# Patient Record
Sex: FEMALE | Race: WHITE
[De-identification: names, ages, dates, MRNs, and addresses within clinical notes are randomized per-mention and may not be internally consistent; named-entity substitution may affect disease eponyms.]

---

## 2019-09-09 ENCOUNTER — HOSPITAL ENCOUNTER (INPATIENT)
Dept: HOSPITAL 41 - JD.OB | Age: 34
LOS: 3 days | Discharge: HOME | DRG: 831 | End: 2019-09-12
Attending: OBSTETRICS & GYNECOLOGY | Admitting: OBSTETRICS & GYNECOLOGY
Payer: SELF-PAY

## 2019-09-09 DIAGNOSIS — O34.219: ICD-10-CM

## 2019-09-09 DIAGNOSIS — Z79.899: ICD-10-CM

## 2019-09-09 DIAGNOSIS — Z3A.36: ICD-10-CM

## 2019-09-09 DIAGNOSIS — E03.9: ICD-10-CM

## 2019-09-09 DIAGNOSIS — Z90.49: ICD-10-CM

## 2019-09-09 DIAGNOSIS — Z88.8: ICD-10-CM

## 2019-09-09 DIAGNOSIS — Z88.5: ICD-10-CM

## 2019-09-09 DIAGNOSIS — Z88.0: ICD-10-CM

## 2019-09-09 DIAGNOSIS — K83.1: ICD-10-CM

## 2019-09-09 NOTE — PCM.LDHP
L&D History of Present Illness





- General


Date of Service: 19


Admit Problem/Dx: 


 Admission Diagnosis/Problem











Admission Diagnosis/Problem    














Source of Information: Patient


History Limitations: Reports: No Limitations





- History of Present Illness


Introduction:: 





Patient is a 35 y/o  who presents at 36 2/7 wks for further monitoring.

  Patient with known cholestasis of pregnancy and plans for delivery at the end 

of this week.  Today in clinic had a mild range BP and then normal repeat.  

Labs done and notable for ALT of 95 and slightly elevated AST of 36.  

Creatinine and Plts normal.  Patient feeling well.  No headache, vision 

changes.  Had some RUQ pain yesterday 





- Related Data


Allergies/Adverse Reactions: 


 Allergies











Allergy/AdvReac Type Severity Reaction Status Date / Time


 


lead Allergy  PT SPECIFIC Verified 19 17:18


 


mercury (elemental) Allergy  PT SPECIFIC Verified 19 17:18


 


acetaminophen [From Percocet] AdvReac  Seizure Verified 19 17:18


 


codeine AdvReac  Seizure Verified 19 17:18


 


oxycodone AdvReac  Seizure Verified 19 17:18


 


oxycodone HCl [From Percocet] AdvReac  Seizure Verified 19 17:18


 


penicillin G AdvReac  Seizure Verified 19 17:18











Home Medications: 


 Home Meds





Pnv with Ca,No.71/Iron/Fa [Prenatal Vitamin Tablet] 1 tab PO DAILY 08/28/15 [

History]


Thyroid T3 Compound 70 mcg PO DAILY 08/28/15 [History]


Ursodiol [Actigal] 300 mg PO TID 08/28/15 [History]


Acetaminophen [Tylenol] 650 mg PO Q6H #50 tablet 08/31/15 [Rx]


Acetaminophen/HYDROcodone [Norco 325-5 MG] 1 tab PO Q6H PRN #12 tablet 08/31/15 

[Rx]


Docusate Sodium [Colace] 100 mg PO Q12H PRN #50 cap 08/31/15 [Rx]


Ibuprofen [Motrin] 200 - 800 mg PO Q6H PRN #50 tablet 08/31/15 [Rx]


Lanolin [Lansinoh HPA] 1 applic TOP ASDIRECTED PRN #1 crm 08/31/15 [Rx]


Simethicone 80 mg PO PCBED #50 tab.chew 08/31/15 [Rx]











Past Medical History


: 6


Para: 5 (6 living children)


LMP (Approximate): Pregnant


Other OB/BYN History:  -  breech.   - .   - .  

 - .  2015 - , breech twins


Psychiatric History: Reports: Anxiety





- Past Surgical History


GI Surgical History: Reports: Cholecystectomy


Female  Surgical History: Reports:  Section (, ), Cystectomy, 

Other (See Below) (diagnostic lap)





Social & Family History





- Tobacco Use


Smoking Status *Q: Never Smoker





- Alcohol Use


Alcohol Use History: No





- Recreational Drug Use


Recreational Drug Use: No





H&P Review of Systems





- Review of Systems:


Review Of Systems: See Below


General: Reports: No Symptoms


Pulmonary: Reports: No Symptoms


Cardiovascular: Reports: No Symptoms


Gastrointestinal: Reports: Abdominal Pain (contractions)


Genitourinary: Reports: No Symptoms


Musculoskeletal: Reports: No Symptoms


Psychiatric: Reports: No Symptoms


Neurological: Reports: No Symptoms





L&D Exam





- Exam


Exam: See Below





- Vital Signs


Weight: 96.252 kg





- OB Specific


Contraction Intensity: Irritability


Fetal Movement: Active


Fetal Heart Tones: Present


Fetal Heart Tones per Min: 145


Fetal Heart Rate (FHR) Variability: Moderate (6-25 bmp)


Birth Presentation: Breech





- Browne Score


Browne Score Cervix Position: Posterior


Browne Score Consistency: Soft


Browne Score Effacement: 51-70%


Browne Score Dilation: 1-2 cm


Browne Score Infant's Station: -3


Browne Score Total: 5





- Exam


General: Alert, Oriented, Cooperative


Lungs: Clear to Auscultation, Normal Respiratory Effort


Cardiovascular: Regular Rate, Regular Rhythm


GI/Abdominal Exam: Soft, Non-Tender


Genitourinary: Normal external exam


Extremities: Normal Inspection


Skin: Warm, Dry, Intact


DTR: 2+: Patella (L), Patella (R)





- Problem List


(1) 36 weeks gestation of pregnancy


SNOMED Code(s): 13774803


   ICD Code: Z3A.36 - 36 WEEKS GESTATION OF PREGNANCY   Status: Acute   Current 

Visit: Yes   





(2) Cholestasis


SNOMED Code(s): 58957673


   ICD Code: K83.1 - OBSTRUCTION OF BILE DUCT   Status: Acute   Current Visit: 

Yes   





(3) Preeclampsia


SNOMED Code(s): 108060143


   ICD Code: O14.90 - UNSPECIFIED PRE-ECLAMPSIA, UNSPECIFIED TRIMESTER   Status

: Acute   Current Visit: Yes   


Qualifiers: 


   Trimester: third trimester   Qualified Code(s): O14.93 - Unspecified pre-

eclampsia, third trimester   





(4) History of 


SNOMED Code(s): 522454181


   ICD Code: Z98.891 - HISTORY OF UTERINE SCAR FROM PREVIOUS SURGERY   Status: 

Acute   Current Visit: Yes   





(5) History of 


SNOMED Code(s): 136911927, 304115563


   ICD Code: Z98.891 - HISTORY OF UTERINE SCAR FROM PREVIOUS SURGERY   Status: 

Acute   Current Visit: Yes   





(6) Breech presentation


SNOMED Code(s): 9685565


   ICD Code: O32.1XX0 - MATERNAL CARE FOR BREECH PRESENTATION, UNSP   Status: 

Acute   Current Visit: Yes   


Qualifiers: 


   Fetus number: single or unspecified fetus   Qualified Code(s): O32.1XX0 - 

Maternal care for breech presentation, not applicable or unspecified   


Problem List Initiated/Reviewed/Updated: Yes


Orders Last 24hrs: 


 Active Orders 24 hr











 Category Date Time Status


 


 Communication Order [RC] ROUTINE Care  19 17:37 Ordered


 


 Fetal Heart Tones [RC] PER UNIT ROUTINE Care  19 17:37 Ordered


 


 Fetal Non Stress Test [RC] PER UNIT ROUTINE Care  19 17:06 Active


 


 Peripheral IV Care [RC] .AS DIRECTED Care  19 17:07 Active


 


 Peripheral IV Care [RC] .AS DIRECTED Care  19 17:37 Ordered


 


 Procedure Site Prep Instruct [RC] ASDIRECTED Care  19 17:37 Ordered


 


 Up ad Malathi [RC] ASDIRECTED Care  19 17:06 Active


 


 Verify Patient Consent Obtain [RC] PER UNIT ROUTINE Care  19 17:37 

Ordered


 


 Vital Signs [RC] PER UNIT ROUTINE Care  19 17:06 Active


 


 Vital Signs [RC] PFP Care  19 17:37 Ordered


 


 Nothing Per Oral Diet [DIET] Diet  19 Dinner Active


 


 TYPE AND SCREEN [BBK] Routine Lab  19 17:32 Received


 


 Citric Acid/Sodium Citrate [Bicitra Solution] Med  19 17:37 Once





 30 ml PO ONETIME ONE   


 


 Lactated Ringers @ 125 MLS/HR(1000ml) Med  19 17:45 Ordered





 Lactated Ringers [Ringers, Lactated] 1,000 ml   





 IV ASDIRECTED   


 


 Metoclopramide [Reglan] Med  19 17:37 Once





 10 mg IVPUSH ONETIME ONE   


 


 Nalbuphine [Nubain] Med  19 17:37 Ordered





 10 mg IVPUSH Q2H PRN   


 


 Oxytocin/Lactated Ringers [Pitocin in LR 10 Units/1,000 Med  19 17:45 

Ordered





 ML]   





 10 unit in 1,000 ml IV ASDIRECTED   


 


 Sodium Chloride 0.9% [Saline Flush] Med  19 17:06 Active





 10 ml FLUSH ASDIRECTED PRN   


 


 Sodium Chloride 0.9% [Saline Flush] Med  19 17:37 Ordered





 10 ml FLUSH ASDIRECTED PRN   


 


 ceFAZolin [Ancef] 2 gm Med  19 17:37 Ordered





 Premix Bag 1 bag   





 IV ONETIME   


 


 Peripheral IV Insertion Adult [OM.PC] Routine Oth  19 17:37 Ordered


 


 Peripheral IV Insertion Adult [OM.PC] Urgent Oth  19 17:06 Ordered


 


 Schedule Procedure [COMM] Per Unit Routine Oth  19 17:37 Ordered


 


 Resuscitation Status Routine Resus Stat  19 17:06 Ordered








 Medication Orders





Citric Acid/Sodium Citrate (Bicitra Solution)  30 ml PO ONETIME ONE


   Stop: 19 17:38


Cefazolin Sodium/Dextrose 2 gm (/ Premix)  50 mls @ 100 mls/hr IV ONETIME ONE


   Stop: 19 18:06


Lactated Ringer's (Ringers, Lactated)  1,000 mls @ 125 mls/hr IV ASDIRECTED MARIA LUZ


Oxytocin/Lactated Ringer's (Pitocin In Lr 10 Units/1,000 Ml)  10 unit in 1,000 

mls @ 100 mls/hr IV ASDIRECTED MARIA LUZ; Protocol


Metoclopramide HCl (Reglan)  10 mg IVPUSH ONETIME ONE


   Stop: 19 17:38


Nalbuphine HCl (Nubain)  10 mg IVPUSH Q2H PRN


   PRN Reason: Pain


Sodium Chloride (Saline Flush)  10 ml FLUSH ASDIRECTED PRN


   PRN Reason: Keep Vein Open


Sodium Chloride (Saline Flush)  10 ml FLUSH ASDIRECTED PRN


   PRN Reason: Keep Vein Open








Assessment/Plan Comment:: 





Patient is a 35 y/o  at 36 2/7 wks who presents for further monitoring 

of elevated ALT/slightly elevated AST.  LFT elevation thought to be likely from 

cholestasis, however, did have one mild range BP in clinic.  Now with multiple 

mild range BP's on L&D.  Will move forward with delivery plans this admission.  

Patient with one  for breech, then 3 successful 's, then  

for breech twins.  Interested in TOLAC, but breech again this pregnancy.  Would 

like to do an attempt at ECV.  If successful will move forward with IOL.  If 

unsuccessful will proceed with RLTCS.  She agrees.  Consents signed.  OR/

anesthesia teams made aware.  








Mai Villarreal MD

## 2019-09-09 NOTE — PCM.PREANE
Preanesthetic Assessment





- Procedure


Proposed Procedure: 





attempted version 





- Anesthesia/Transfusion/Family Hx


Anesthesia History: Prior Anesthesia Without Reaction


Family History of Anesthesia Reaction: No


Transfusion History: No Prior Transfusion(s)


Type of Transfusion Reactions: Reports: Unknown


Intubation History: Unknown





- Review of Systems


General: No Symptoms


Pulmonary: No Symptoms


Cardiovascular: No Symptoms


Gastrointestinal: No Symptoms


Neurological: No Symptoms


Other: Reports: Thyroid Problems (hypothryoidism )





- Physical Assessment


NPO Status Date: 19


NPO Status Time: 16:00


Vital Signs: 





 Last Vital Signs











Temp  36.9 C   19 17:06


 


Pulse  115 H  19 17:06


 


Resp  18   19 17:06


 


BP  141/61 H  19 17:06


 


Pulse Ox      











Height: 1.65 m


Weight: 96.252 kg


ASA Class: 2E


Mental Status: Alert & Oriented x3


Airway Class: Mallampati = 1


Dentition: Reports: Crown(s) (multiple/ permanent )


Thyro-Mental Finger Breadths: 3


Mouth Opening Finger Breadths: 5


ROM/Head Extension: Full


Lungs: Clear to Auscultation, Normal Respiratory Effort


Cardiovascular: Regular Rate, Regular Rhythm





- Allergies


Allergies/Adverse Reactions: 


 Allergies











Allergy/AdvReac Type Severity Reaction Status Date / Time


 


lead Allergy  PT SPECIFIC Verified 19 17:18


 


mercury (elemental) Allergy  PT SPECIFIC Verified 19 17:18


 


acetaminophen [From Percocet] AdvReac  Seizure Verified 19 17:18


 


codeine AdvReac  Seizure Verified 19 17:18


 


oxycodone AdvReac  Seizure Verified 19 17:18


 


oxycodone HCl [From Percocet] AdvReac  Seizure Verified 19 17:18


 


penicillin G AdvReac  Seizure Verified 19 17:18














- Blood


Blood Available: No





PreAnesthesia Questionnaire


OB/GYN History: Reports: Polycystic Ovaries, Pregnancy


Other OB/BYN History: 2003 -  breech.  2005 - .   - .  

2013 - .  2015 - , breech twins


Neurological History: Reports: Other (See Below)


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


Psychiatric History: Reports: Anxiety


Endocrine/Metabolic History: Reports: Hypothyroidism





- Past Surgical History


GI Surgical History: Reports: Cholecystectomy


Female  Surgical History: Reports:  Section (, ), Cystectomy, 

Other (See Below) (diagnostic lap)





- SUBSTANCE USE


Smoking Status *Q: Never Smoker


Tobacco Use Within Last Twelve Months: No


Second Hand Smoke Exposure: No


Recreational Drug Use History: No





- HOME MEDS


Home Medications: 


 Home Meds





Pnv with Ca,No.71/Iron/Fa [Prenatal Vitamin Tablet] 1 tab PO DAILY 08/28/15 [

History]


Thyroid T3 Compound 70 mcg PO DAILY 08/28/15 [History]


Ursodiol [Actigal] 300 mg PO TID 08/28/15 [History]


Acetaminophen [Tylenol] 650 mg PO Q6H #50 tablet 08/31/15 [Rx]


Acetaminophen/HYDROcodone [Norco 325-5 MG] 1 tab PO Q6H PRN #12 tablet 08/31/15 

[Rx]


Docusate Sodium [Colace] 100 mg PO Q12H PRN #50 cap 08/31/15 [Rx]


Ibuprofen [Motrin] 200 - 800 mg PO Q6H PRN #50 tablet 08/31/15 [Rx]


Lanolin [Lansinoh HPA] 1 applic TOP ASDIRECTED PRN #1 crm 08/31/15 [Rx]


Simethicone 80 mg PO PCBED #50 tab.chew 08/31/15 [Rx]











- CURRENT (IN HOUSE) MEDS


Current Meds: 





 Current Medications





Lactated Ringer's (Ringers, Lactated)  1,000 mls @ 125 mls/hr IV ASDIRECTED MARIA LUZ


Oxytocin/Lactated Ringer's (Pitocin In Lr 10 Units/1,000 Ml)  10 unit in 1,000 

mls @ 100 mls/hr IV ASDIRECTED MARIA LUZ; Protocol


Nalbuphine HCl (Nubain)  10 mg IVPUSH Q2H PRN


   PRN Reason: Pain


Sodium Chloride (Saline Flush)  10 ml FLUSH ASDIRECTED PRN


   PRN Reason: Keep Vein Open


Sodium Chloride (Saline Flush)  10 ml FLUSH ASDIRECTED PRN


   PRN Reason: Keep Vein Open





Discontinued Medications





Cefazolin Sodium (Ancef) Confirm Administered Dose 2 gm .ROUTE .STK-MED ONE


   Stop: 19 17:57


Citric Acid/Sodium Citrate (Bicitra Solution)  30 ml PO ONETIME ONE


   Stop: 19 17:38


Cefazolin Sodium/Dextrose 2 gm (/ Premix)  50 mls @ 100 mls/hr IV ONETIME ONE


   Stop: 19 18:06


Metoclopramide HCl (Reglan)  10 mg IVPUSH ONETIME ONE


   Stop: 19 17:38


Morphine Sulfate (Duramorph Pf) Confirm Administered Dose 10 mg .ROUTE .STK-MED 

ONE


   Stop: 19 17:56


Ondansetron HCl (Zofran) Confirm Administered Dose 4 mg .ROUTE .STK-MED ONE


   Stop: 19 17:57


Oxytocin (Pitocin) Confirm Administered Dose 10 unit .ROUTE .STK-MED ONE


   Stop: 19 17:55


Terbutaline Sulfate (Brethine)  0.25 mg SUBCUT ONETIME ONE


   Stop: 19 18:03

## 2019-09-09 NOTE — PCM.PRNOTE
- Free Text/Narrative


Note: 





PROCEDURE NOTE





Procedure Date: 2019





Pre-operative Diagnosis:


1. Pregnancy at 36 2/7


2. Breech Presentation


3. Hx of  x2, hx of  x3


4. Cholestasis of pregnancy 


5. Preeclampsia 





Post-operative Diagnosis: 


1. As above s/p successful external cephalic version 





Anesthesia: None


 


Description of Operation/Procedure: 


The risks, benefits, indications, potential complications, and alternatives 

were explained to the patient and informed consent obtained.





Patient was placed in the supine position.  Ultrasound was used to confirm 

complete breech presentation and appropriate MINGO.  Terbutaline 0.25 mg 

subcutaneous was given.  Hands were placed on the patient's abdomen.  The 

breech was elevated out of the pelvis while clockwise traction was applied to 

the head.  No movement of baby felt with this motion.  Next, counterclockwise 

traction done and baby was felt to turn.  Ultrasound confirmed cephalic 

presentation as well as fetal cardiac activity.





Complications: The patient tolerated the procedure well and no complications 

were noted. 





Plan:   Will move forward with IOL.  Pitocin to be started.  AROM when able.  

Plan frequent assessments of fetal position in labor

## 2019-09-09 NOTE — PCM.PNLD
Labor Progress Note





- VS & Meds


Vital Signs: 


 Last Vital Signs











Temp  36.9 C   09/09/19 17:06


 


Pulse  115 H  09/09/19 17:06


 


Resp  18   09/09/19 17:06


 


BP  141/61 H  09/09/19 17:06


 


Pulse Ox      











Active Medications: 


 Current Medications





Lactated Ringer's (Ringers, Lactated)  1,000 mls @ 125 mls/hr IV ASDIRECTED MARIA LUZ


   Last Admin: 09/09/19 18:21 Dose:  125 mls/hr


Oxytocin/Lactated Ringer's (Pitocin In Lr 10 Units/1,000 Ml)  10 unit in 1,000 

mls @ 100 mls/hr IV ASDIRECTED MARIA LUZ; Protocol


Oxytocin/Lactated Ringer's (Pitocin In Lr 10 Units/1,000 Ml)  10 unit in 1,000 

mls @ 12 mls/hr IV TITRATE MARIA LUZ; Protocol


   Last Titration: 09/09/19 20:58 Dose:  12 munits/min, 72 mls/hr


Nalbuphine HCl (Nubain)  10 mg IVPUSH Q2H PRN


   PRN Reason: Pain


Sodium Chloride (Saline Flush)  10 ml FLUSH ASDIRECTED PRN


   PRN Reason: Keep Vein Open


Sodium Chloride (Saline Flush)  10 ml FLUSH ASDIRECTED PRN


   PRN Reason: Keep Vein Open





Discontinued Medications





Cefazolin Sodium (Ancef) Confirm Administered Dose 2 gm .ROUTE .STK-MED ONE


   Stop: 09/09/19 17:57


Citric Acid/Sodium Citrate (Bicitra Solution)  30 ml PO ONETIME ONE


   Stop: 09/09/19 17:38


Cefazolin Sodium/Dextrose 2 gm (/ Premix)  50 mls @ 100 mls/hr IV ONETIME ONE


   Stop: 09/09/19 18:06


Metoclopramide HCl (Reglan)  10 mg IVPUSH ONETIME ONE


   Stop: 09/09/19 17:38


Morphine Sulfate (Duramorph Pf) Confirm Administered Dose 10 mg .ROUTE .STK-MED 

ONE


   Stop: 09/09/19 17:56


Nalbuphine HCl (Nubain)  10 mg IVPUSH Q2H PRN


   PRN Reason: Pain


Ondansetron HCl (Zofran) Confirm Administered Dose 4 mg .ROUTE .STK-MED ONE


   Stop: 09/09/19 17:57


Oxytocin (Pitocin) Confirm Administered Dose 10 unit .ROUTE .STK-MED ONE


   Stop: 09/09/19 17:55


Terbutaline Sulfate (Brethine)  0.25 mg SUBCUT ONETIME ONE


   Stop: 09/09/19 18:03


   Last Admin: 09/09/19 18:21 Dose:  0.25 mg











- Uterine Contractions


Uterine Monitoring Mode: External [place]


Contraction Intensity: Mild


Uterine Resting Tone: Soft





- Fetal Monitoring


Fetal Monitor Mode: External Ultrasound


Fetal Heart Rate (FHR) Baseline: 150


Fetal Heart Rate (FHR) Variability: Moderate (6-25 bmp)


Fetal Accelerations: Present, 15x15


Fetal Decelerations: None


Fetal Strip Review: Category I





- Vaginal Exam


Dilation (cm): 3-4


Effacement (Percent): 50


Station: -2


Cervical Position: Posterior





- Labor Progress (Free Text)


Labor Progress: 





Doing well.  Cantrell bulb just came out.  Pitocin at at 12.  Patient with good 

change.  Rates contractions as a 3/10 in intensity.  AROM performed with 

release of large amount of meconium stained fluid.  Continue present management

## 2019-09-10 PROCEDURE — 4A1HXCZ MONITORING OF PRODUCTS OF CONCEPTION, CARDIAC RATE, EXTERNAL APPROACH: ICD-10-PCS | Performed by: OBSTETRICS & GYNECOLOGY

## 2019-09-10 PROCEDURE — 10S0XZZ REPOSITION PRODUCTS OF CONCEPTION, EXTERNAL APPROACH: ICD-10-PCS | Performed by: OBSTETRICS & GYNECOLOGY

## 2019-09-10 PROCEDURE — 10H07YZ INSERTION OF OTHER DEVICE INTO PRODUCTS OF CONCEPTION, VIA NATURAL OR ARTIFICIAL OPENING: ICD-10-PCS | Performed by: OBSTETRICS & GYNECOLOGY

## 2019-09-10 PROCEDURE — 10907ZC DRAINAGE OF AMNIOTIC FLUID, THERAPEUTIC FROM PRODUCTS OF CONCEPTION, VIA NATURAL OR ARTIFICIAL OPENING: ICD-10-PCS | Performed by: OBSTETRICS & GYNECOLOGY

## 2019-09-10 PROCEDURE — 3E033VJ INTRODUCTION OF OTHER HORMONE INTO PERIPHERAL VEIN, PERCUTANEOUS APPROACH: ICD-10-PCS | Performed by: OBSTETRICS & GYNECOLOGY

## 2019-09-10 NOTE — PCM.DEL
L & D Note





- General Info


Date of Service: 09/10/19





- Delivery Note


Labor: Induced by ARM


Cervical Ripening Method: Balloon Device, Oxytocin


Delivery Outcome: Livebirth


Infant Delivery Method: Spontaneous Vaginal Delivery-Single


Infant Delivery Mode: Spontaneous


Birth Presentation: Vertex


Nuchal Cord: Present, Reduced


Anesthesia Type: Epidural


Amniotic Fluid Description: Meconium Stained


Episiotomy Type: None


Laceration: None


Placenta: Intact, Spontaneous


Cord: 3 Vessels


Estimated Blood Loss: 750


: Bulb Syringe, Stimulated, Warmed, [place]obeetie Used, Warmer Used


Delivery Comments (Free Text/Narrative):: 





Patient found to be complete and began pushing.  With maternal pushing effort 

fetal head delivered from TANK presentation.  Nuchal cord present and reduced.  

With gentle downward traction fetal shoulders and body delivered.  Infant 

placed on maternal abdomen.  Cord clamped and cut.  Baby handed to awaiting 

Pediatrician.  Cord blood collected. Placenta allowed time to separate and 

expelled intact.  Patient with poor uterine tone and increased bleeding.  Given 

600 mcg of buccal cytotec and then 250 mcg of IM Hemabate.  Still with poor 

response and so tranexamic acid begun.  Multiple sweeps of PEDRO done with 

removal of large clot.  As still with difficulty patient given also dose of 0.2 

IM methergine and bladder emptied of scant amout of urine.  Finally bleeding 

responded.  Inspection of perineum showed no lacerations.  





--------------





About 1.5 hours after delivery patient in bed and not easily awoken.  Rapid 

response called, but then patient improved.  Seemed to have syncopal episode.  

Uterine bleeding WNL, uterine tone good.  Labs done and WNL.  Bladder emptied 

of 800 cc urine via straight catheterization.  Continue to monitor closely 





- General Info


Date of Service: 09/10/19





- Patient Data


Vitals - Most Recent: 


 Last Vital Signs











Temp  36.9 C   19 17:06


 


Pulse  115 H  19 17:06


 


Resp  18   19 17:06


 


BP  141/61 H  19 17:06


 


Pulse Ox      











Weight - Most Recent: 96.252 kg


Lab Results Last 24 Hours: 


 Laboratory Results - last 24 hr











  19 Range/Units





  17:32 


 


Blood Type  O POSITIVE  


 


Gel Antibody Screen  Negative  











Med Orders - Current: 


 Current Medications





Ephedrine Sulfate (Ephedrine Sulfate)  5 mg IVPUSH ASDIRECTED PRN


   PRN Reason: Hypotension


Fentanyl (Sublimaze)  100 mcg EPIDUR Q3H PRN


   PRN Reason: Pain


   Last Admin: 09/10/19 05:42 Dose:  100 mcg


Fentanyl/Bupivacaine HCl (Fentanyl/Bupivacaine/Ns 2 Mcg-0.125% 100 Ml)  100 ml 

EPIDUR ASDIRECTED MARIA LUZ


   Last Admin: 09/10/19 05:42 Dose:  100 ml


Lactated Ringer's (Ringers, Lactated)  1,000 mls @ 125 mls/hr IV ASDIRECTED MARIA LUZ


   Last Admin: 09/10/19 06:05 Dose:  125 mls/hr


Oxytocin/Lactated Ringer's (Pitocin In Lr 10 Units/1,000 Ml)  10 unit in 1,000 

mls @ 12 mls/hr IV TITRATE MARIA LUZ; Protocol


   Last Titration: 09/10/19 01:40 Dose:  18 munits/min, 108 mls/hr


Oxytocin 20 unit/ Lactated (Ringer's)  1,002 mls @ 60.12 mls/hr IV TITRATE MARIA LUZ; 

Protocol


   Last Titration: 09/10/19 09:15 Dose:  30 munits/min, 90.18 mls/hr


Nalbuphine HCl (Nubain)  10 mg IVPUSH Q2H PRN


   PRN Reason: Pain


Sodium Chloride (Saline Flush)  10 ml FLUSH ASDIRECTED PRN


   PRN Reason: Keep Vein Open


Sodium Chloride (Saline Flush)  10 ml FLUSH ASDIRECTED PRN


   PRN Reason: Keep Vein Open





Discontinued Medications





Carboprost Tromethamine (Hemabate Ds) Confirm Administered Dose 250 mcg .ROUTE 

.STK-MED ONE


   Stop: 09/10/19 10:58


Cefazolin Sodium (Ancef) Confirm Administered Dose 2 gm .ROUTE .STK-MED ONE


   Stop: 19 17:57


Citric Acid/Sodium Citrate (Bicitra Solution)  30 ml PO ONETIME ONE


   Stop: 19 17:38


Cefazolin Sodium/Dextrose 2 gm (/ Premix)  50 mls @ 100 mls/hr IV ONETIME ONE


   Stop: 19 18:06


Oxytocin/Lactated Ringer's (Pitocin In Lr 10 Units/1,000 Ml)  10 unit in 1,000 

mls @ 100 mls/hr IV ASDIRECTED MARIA LUZ; Protocol


Metoclopramide HCl (Reglan)  10 mg IVPUSH ONETIME ONE


   Stop: 19 17:38


Misoprostol (Cytotec) Confirm Administered Dose 600 mcg .ROUTE .STK-MED ONE


   Stop: 09/10/19 10:52


Morphine Sulfate (Duramorph Pf) Confirm Administered Dose 10 mg .ROUTE .STK-MED 

ONE


   Stop: 19 17:56


Nalbuphine HCl (Nubain)  10 mg IVPUSH Q2H PRN


   PRN Reason: Pain


Ondansetron HCl (Zofran) Confirm Administered Dose 4 mg .ROUTE .STK-MED ONE


   Stop: 19 17:57


Oxytocin (Pitocin) Confirm Administered Dose 10 unit .ROUTE .STK-MED ONE


   Stop: 19 17:55


Terbutaline Sulfate (Brethine)  0.25 mg SUBCUT ONETIME ONE


   Stop: 19 18:03


   Last Admin: 19 18:21 Dose:  0.25 mg


Tranexamic Acid (Cyklokapron) Confirm Administered Dose 1,000 mg .ROUTE .STK-

MED ONE


   Stop: 09/10/19 10:56











- Problem List & Annotations


(1) 36 weeks gestation of pregnancy


SNOMED Code(s): 63910463


   Code(s): Z3A.36 - 36 WEEKS GESTATION OF PREGNANCY   Status: Acute   Current 

Visit: Yes   





(2) Cholestasis


SNOMED Code(s): 29438102


   Code(s): K83.1 - OBSTRUCTION OF BILE DUCT   Status: Acute   Current Visit: 

Yes   





(3) Preeclampsia


SNOMED Code(s): 598551832


   Code(s): O14.90 - UNSPECIFIED PRE-ECLAMPSIA, UNSPECIFIED TRIMESTER   Status: 

Acute   Current Visit: Yes   


Qualifiers: 


   Trimester: third trimester   Qualified Code(s): O14.93 - Unspecified pre-

eclampsia, third trimester   





(4) History of 


SNOMED Code(s): 191816801


   Code(s): Z98.891 - HISTORY OF UTERINE SCAR FROM PREVIOUS SURGERY   Status: 

Acute   Current Visit: Yes   





(5) History of 


SNOMED Code(s): 168860008, 486495224


   Code(s): Z98.891 - HISTORY OF UTERINE SCAR FROM PREVIOUS SURGERY   Status: 

Acute   Current Visit: Yes   





(6) Breech presentation


SNOMED Code(s): 1122672


   Code(s): O32.1XX0 - MATERNAL CARE FOR BREECH PRESENTATION, UNSP   Status: 

Acute   Current Visit: Yes   


Qualifiers: 


   Fetus number: single or unspecified fetus   Qualified Code(s): O32.1XX0 - 

Maternal care for breech presentation, not applicable or unspecified   





(7) Successful external cephalic version


SNOMED Code(s): 27711297


   Code(s): PGL8167 -    Status: Acute   Current Visit: Yes   





(8) , delivered, current hospitalization


SNOMED Code(s): 872032665


   Code(s): O34.219 - MATERNAL CARE FOR UNSP TYPE SCAR FROM PREVIOUS  

DEL   Status: Acute   Current Visit: Yes   





(9) Postpartum hemorrhage


SNOMED Code(s): 07212398


   Code(s): O72.1 - OTHER IMMEDIATE POSTPARTUM HEMORRHAGE   Status: Acute   

Current Visit: Yes   





- Problem List Review


Problem List Initiated/Reviewed/Updated: Yes





- My Orders


Last 24 Hours: 


My Active Orders





19 17:06


Up ad Malathi [RC] ASDIRECTED 


Vital Signs [RC] 21,03,09,15 


Sodium Chloride 0.9% [Saline Flush]   10 ml FLUSH ASDIRECTED PRN 


Peripheral IV Insertion Adult [OM.PC] Urgent 


Resuscitation Status Routine 





19 17:37


Communication Order [RC] ROUTINE 


Fetal Heart Tones [RC] PER UNIT ROUTINE 


Peripheral IV Care [RC] Q2HR 


Vital Signs [RC] PFP 


Sodium Chloride 0.9% [Saline Flush]   10 ml FLUSH ASDIRECTED PRN 


Peripheral IV Insertion Adult [OM.PC] Routine 





19 17:45


Lactated Ringers [Ringers, Lactated] 1,000 ml IV ASDIRECTED 





19 18:42


Patient Status [ADT] Routine 


Communication Order [RC] ASDIRECTED 


Notify Provider [RC] ASDIRECTED 


Notify Provider [RC] PRN 


Vaginal Exam [RC] ASDIRECTED 


Nalbuphine [Nubain]   10 mg IVPUSH Q2H PRN 


Electronic Fetal Heart Tones Internal [WOMSER] Per Unit Routine 





19 18:45


Oxytocin/Lactated Ringers [Pitocin in LR 10 Units/1,000 ML] 10 unit in 1,000 ml 

IV TITRATE 





19 Dinner


Nothing Per Oral Diet [DIET] 





09/10/19 06:15


Oxytocin [Pitocin] 20 unit   Lactated Ringers [Ringers, Lactated] 1,000 ml IV 

TITRATE 





09/10/19 11:23


Carboprost Tromethamine [Hemabate DS]   250 mcg IM ONETIME ONE 


Methylergonovine [Methergine]   0.2 mg IM Q4H STA 


Tranexamic Acid [Cyklokapron]   1,000 mg IVPUSH ONETIME ONE 


ceFAZolin [Ancef] 2 gm   Premix Bag 1 bag IV ONETIME 


miSOPROStol [Cytotec]   600 mcg PO NOW STA 














- Assessment


Assessment:: 





33 y/o G6 now  PPD#0 from  at 36 3/7 wks after ECV and IOL for 

cholestasis/preeclampsia 





- Plan


Plan:: 





/PPH


* ROutine cares


* Monitor bleeding closely 


* Breast feeding


* Discharge in 2 days

## 2019-09-10 NOTE — PCM.PNLD
Labor Progress Note





- VS & Meds


Vital Signs: 


 Last Vital Signs











Temp  36.9 C   09/09/19 17:06


 


Pulse  115 H  09/09/19 17:06


 


Resp  18   09/09/19 17:06


 


BP  141/61 H  09/09/19 17:06


 


Pulse Ox      











Active Medications: 


 Current Medications





Ephedrine Sulfate (Ephedrine Sulfate)  5 mg IVPUSH ASDIRECTED PRN


   PRN Reason: Hypotension


Fentanyl (Sublimaze)  100 mcg EPIDUR Q3H PRN


   PRN Reason: Pain


   Last Admin: 09/10/19 05:42 Dose:  100 mcg


Fentanyl/Bupivacaine HCl (Fentanyl/Bupivacaine/Ns 2 Mcg-0.125% 100 Ml)  100 ml 

EPIDUR ASDIRECTED MARIA LUZ


   Last Admin: 09/10/19 05:42 Dose:  100 ml


Lactated Ringer's (Ringers, Lactated)  1,000 mls @ 125 mls/hr IV ASDIRECTED MARIA LUZ


   Last Admin: 09/10/19 06:05 Dose:  125 mls/hr


Oxytocin/Lactated Ringer's (Pitocin In Lr 10 Units/1,000 Ml)  10 unit in 1,000 

mls @ 100 mls/hr IV ASDIRECTED MARIA LUZ; Protocol


Oxytocin/Lactated Ringer's (Pitocin In Lr 10 Units/1,000 Ml)  10 unit in 1,000 

mls @ 12 mls/hr IV TITRATE MARIA LUZ; Protocol


   Last Titration: 09/10/19 01:40 Dose:  18 munits/min, 108 mls/hr


Oxytocin 20 unit/ Lactated (Ringer's)  1,002 mls @ 60.12 mls/hr IV TITRATE MARIA LUZ; 

Protocol


   Last Admin: 09/10/19 06:06 Dose:  20 munits/min, 60.12 mls/hr


Nalbuphine HCl (Nubain)  10 mg IVPUSH Q2H PRN


   PRN Reason: Pain


Sodium Chloride (Saline Flush)  10 ml FLUSH ASDIRECTED PRN


   PRN Reason: Keep Vein Open


Sodium Chloride (Saline Flush)  10 ml FLUSH ASDIRECTED PRN


   PRN Reason: Keep Vein Open





Discontinued Medications





Cefazolin Sodium (Ancef) Confirm Administered Dose 2 gm .ROUTE .STK-MED ONE


   Stop: 09/09/19 17:57


Citric Acid/Sodium Citrate (Bicitra Solution)  30 ml PO ONETIME ONE


   Stop: 09/09/19 17:38


Cefazolin Sodium/Dextrose 2 gm (/ Premix)  50 mls @ 100 mls/hr IV ONETIME ONE


   Stop: 09/09/19 18:06


Metoclopramide HCl (Reglan)  10 mg IVPUSH ONETIME ONE


   Stop: 09/09/19 17:38


Morphine Sulfate (Duramorph Pf) Confirm Administered Dose 10 mg .ROUTE .STK-MED 

ONE


   Stop: 09/09/19 17:56


Nalbuphine HCl (Nubain)  10 mg IVPUSH Q2H PRN


   PRN Reason: Pain


Ondansetron HCl (Zofran) Confirm Administered Dose 4 mg .ROUTE .STK-MED ONE


   Stop: 09/09/19 17:57


Oxytocin (Pitocin) Confirm Administered Dose 10 unit .ROUTE .STK-MED ONE


   Stop: 09/09/19 17:55


Terbutaline Sulfate (Brethine)  0.25 mg SUBCUT ONETIME ONE


   Stop: 09/09/19 18:03


   Last Admin: 09/09/19 18:21 Dose:  0.25 mg











- Uterine Contractions


Uterine Monitoring Mode: External [place]


Contraction Intensity: Moderate to Strong


Uterine Resting Tone: Soft





- Fetal Monitoring


Fetal Monitor Mode: External Ultrasound


Fetal Heart Rate (FHR) Baseline: 145


Fetal Heart Rate (FHR) Variability: Moderate (6-25 bmp)


Fetal Accelerations: Absent


Fetal Decelerations: None


Fetal Strip Review: Category I





- Vaginal Exam


Dilation (cm): 6-7


Effacement (Percent): 75


Station: -1


Cervical Position: Posterior





- Labor Progress (Free Text)


Labor Progress: 





Now comfortable with epidural in place.  Pitocin at 20.  IUPC placed and shows 

very weak contractions.  Will continue to increase per protocol.

## 2019-09-10 NOTE — PCM.PNLD
Labor Progress Note





- VS & Meds


Vital Signs: 


 Last Vital Signs











Temp  36.9 C   09/09/19 17:06


 


Pulse  115 H  09/09/19 17:06


 


Resp  18   09/09/19 17:06


 


BP  141/61 H  09/09/19 17:06


 


Pulse Ox      











Active Medications: 


 Current Medications





Lactated Ringer's (Ringers, Lactated)  1,000 mls @ 125 mls/hr IV ASDIRECTED MARIA LUZ


   Last Admin: 09/10/19 02:37 Dose:  125 mls/hr


Oxytocin/Lactated Ringer's (Pitocin In Lr 10 Units/1,000 Ml)  10 unit in 1,000 

mls @ 100 mls/hr IV ASDIRECTED MARIA LUZ; Protocol


Oxytocin/Lactated Ringer's (Pitocin In Lr 10 Units/1,000 Ml)  10 unit in 1,000 

mls @ 12 mls/hr IV TITRATE MARIA LUZ; Protocol


   Last Titration: 09/10/19 01:40 Dose:  18 munits/min, 108 mls/hr


Nalbuphine HCl (Nubain)  10 mg IVPUSH Q2H PRN


   PRN Reason: Pain


Sodium Chloride (Saline Flush)  10 ml FLUSH ASDIRECTED PRN


   PRN Reason: Keep Vein Open


Sodium Chloride (Saline Flush)  10 ml FLUSH ASDIRECTED PRN


   PRN Reason: Keep Vein Open





Discontinued Medications





Cefazolin Sodium (Ancef) Confirm Administered Dose 2 gm .ROUTE .STK-MED ONE


   Stop: 09/09/19 17:57


Citric Acid/Sodium Citrate (Bicitra Solution)  30 ml PO ONETIME ONE


   Stop: 09/09/19 17:38


Cefazolin Sodium/Dextrose 2 gm (/ Premix)  50 mls @ 100 mls/hr IV ONETIME ONE


   Stop: 09/09/19 18:06


Metoclopramide HCl (Reglan)  10 mg IVPUSH ONETIME ONE


   Stop: 09/09/19 17:38


Morphine Sulfate (Duramorph Pf) Confirm Administered Dose 10 mg .ROUTE .STK-MED 

ONE


   Stop: 09/09/19 17:56


Nalbuphine HCl (Nubain)  10 mg IVPUSH Q2H PRN


   PRN Reason: Pain


Ondansetron HCl (Zofran) Confirm Administered Dose 4 mg .ROUTE .STK-MED ONE


   Stop: 09/09/19 17:57


Oxytocin (Pitocin) Confirm Administered Dose 10 unit .ROUTE .STK-MED ONE


   Stop: 09/09/19 17:55


Terbutaline Sulfate (Brethine)  0.25 mg SUBCUT ONETIME ONE


   Stop: 09/09/19 18:03


   Last Admin: 09/09/19 18:21 Dose:  0.25 mg











- Uterine Contractions


Uterine Monitoring Mode: External [place]


Contraction Intensity: Moderate to Strong


Uterine Resting Tone: Soft





- Fetal Monitoring


Fetal Monitor Mode: External Ultrasound


Fetal Heart Rate (FHR) Baseline: 150


Fetal Heart Rate (FHR) Variability: Moderate (6-25 bmp)


Fetal Accelerations: Absent


Fetal Decelerations: None


Fetal Strip Review: Category I





- Vaginal Exam


Dilation (cm): 5-6


Effacement (Percent): 50


Station: -1


Cervical Position: Posterior





- Labor Progress (Free Text)


Labor Progress: 





Patient on 18 of pitocin.  Requesting epidural.  Doing well otherwise.  Slow, 

but steady change.  FHR reassuring.

## 2019-09-11 NOTE — PCM.PNPP
- General Info


Date of Service: 19


Functional Status: Reports: Pain Controlled, Tolerating Diet, Ambulating, 

Urinating





- Review of Systems


General: Reports: Fatigue


Pulmonary: Reports: No Symptoms


Cardiovascular: Reports: No Symptoms


Gastrointestinal: Reports: No Symptoms


Genitourinary: Reports: No Symptoms


Musculoskeletal: Reports: No Symptoms


Neurological: Reports: No Symptoms





- Patient Data


Vital Signs - Most Recent: 


 Last Vital Signs











Temp  36.6 C   19 03:40


 


Pulse  88   19 03:40


 


Resp  16   19 03:40


 


BP  117/65   19 03:40


 


Pulse Ox  97   19 03:40











Weight - Most Recent: 96.252 kg


Lab Results - Last 24 Hours: 


 Laboratory Results - last 24 hr











  09/10/19 09/10/19 09/11/19 Range/Units





  13:00 13:00 05:38 


 


WBC   25.25 H  15.56 H  (3.98-10.04)  K/mm3


 


RBC   4.98  3.62 L  (3.98-5.22)  M/mm3


 


Hgb   11.4  8.2 L D  (11.2-15.7)  gm/L


 


Hct   36.6  27.4 L  (34.1-44.9)  %


 


MCV   73.5 L D  75.7 L  (79.4-94.8)  fl


 


MCH   22.9 L  22.7 L  (25.6-32.2)  pg


 


MCHC   31.1 L  29.9 L  (32.2-35.5)  g/dl


 


RDW Std Deviation   47.7 H  47.2 H  (36.4-46.3)  fL


 


Plt Count   290  D  267  (182-369)  K/mm3


 


MPV   9.4  9.7  (9.4-12.3)  fl


 


Neut % (Auto)   87.6 H   (34.0-71.1)  %


 


Lymph % (Auto)   4.9 L   (19.3-51.7)  %


 


Mono % (Auto)   7.0   (4.7-12.5)  %


 


Eos % (Auto)   0.1 L   (0.7-5.8)  


 


Baso % (Auto)   0.1   (0.1-1.2)  %


 


Neut # (Auto)   22.11 H   (1.56-6.13)  K/mm3


 


Lymph # (Auto)   1.24   (1.18-3.74)  K/mm3


 


Mono # (Auto)   1.78 H   (0.24-0.36)  K/mm3


 


Eos # (Auto)   0.03 L   (0.04-0.36)  K/mm3


 


Baso # (Auto)   0.02   (0.01-0.08)  K/mm3


 


Manual Slide Review   Abnormal smear   


 


Sodium  137    (136-145)  mEq/L


 


Potassium  3.8    (3.5-5.1)  mEq/L


 


Chloride  103    ()  mEq/L


 


Carbon Dioxide  21    (21-32)  mEq/L


 


Anion Gap  16.8 H    (5-15)  


 


BUN  4 L    (7-18)  mg/dL


 


Creatinine  0.5 L    (0.55-1.02)  mg/dL


 


Est Cr Clr Drug Dosing  141.31    mL/min


 


Estimated GFR (MDRD)  > 60    (>60)  mL/min


 


BUN/Creatinine Ratio  8.0 L    (14-18)  


 


Glucose  95    ()  mg/dL


 


Calcium  8.6    (8.5-10.1)  mg/dL


 


Total Bilirubin  0.7    (0.2-1.0)  mg/dL


 


AST  29    (15-37)  U/L


 


ALT  69 H    (14-59)  U/L


 


Alkaline Phosphatase  134 H    ()  U/L


 


Total Protein  5.7 L    (6.4-8.2)  g/dl


 


Albumin  2.2 L    (3.4-5.0)  g/dl


 


Globulin  3.5    gm/dL


 


Albumin/Globulin Ratio  0.6 L    (1-2)  











Med Orders - Current: 


 Current Medications





Acetaminophen (Tylenol)  650 mg PO Q4H PRN


   PRN Reason: mild pain or fever


Docusate Sodium (Colace)  100 mg PO BID PRN


   PRN Reason: Constipation


Ibuprofen (Motrin)  600 mg PO Q6H PRN


   PRN Reason: Mild pain or fever


Witch Hazel (Tucks)  1 pad TOP ASDIRECTED PRN


   PRN Reason: Perineal Comfort Measure





Discontinued Medications





Carboprost Tromethamine (Hemabate Ds) Confirm Administered Dose 250 mcg .ROUTE 

.STK-MED ONE


   Stop: 09/10/19 10:58


   Last Admin: 09/10/19 11:39 Dose:  Not Given


Carboprost Tromethamine (Hemabate Ds)  250 mcg IM ONETIME ONE


   Stop: 09/10/19 11:24


   Last Admin: 09/10/19 11:00 Dose:  250 mcg


Cefazolin Sodium (Ancef) Confirm Administered Dose 2 gm .ROUTE .STK-MED ONE


   Stop: 19 17:57


Citric Acid/Sodium Citrate (Bicitra Solution)  30 ml PO ONETIME ONE


   Stop: 19 17:38


   Last Admin: 09/10/19 14:20 Dose:  Not Given


Ephedrine Sulfate (Ephedrine Sulfate)  5 mg IVPUSH ASDIRECTED PRN


   PRN Reason: Hypotension


Fentanyl (Sublimaze)  100 mcg EPIDUR Q3H PRN


   PRN Reason: Pain


   Last Admin: 09/10/19 05:42 Dose:  100 mcg


Fentanyl/Bupivacaine HCl (Fentanyl/Bupivacaine/Ns 2 Mcg-0.125% 100 Ml)  100 ml 

EPIDUR ASDIRECTED MARIA LUZ


   Last Admin: 09/10/19 05:42 Dose:  100 ml


Cefazolin Sodium/Dextrose 2 gm (/ Premix)  50 mls @ 100 mls/hr IV ONETIME ONE


   Stop: 19 18:06


   Last Admin: 09/10/19 14:20 Dose:  Not Given


Lactated Ringer's (Ringers, Lactated)  1,000 mls @ 125 mls/hr IV ASDIRECTED MARIA LUZ


   Last Admin: 09/10/19 11:41 Dose:  125 mls/hr


Oxytocin/Lactated Ringer's (Pitocin In Lr 10 Units/1,000 Ml)  10 unit in 1,000 

mls @ 100 mls/hr IV ASDIRECTED MARIA LUZ; Protocol


Oxytocin/Lactated Ringer's (Pitocin In Lr 10 Units/1,000 Ml)  10 unit in 1,000 

mls @ 12 mls/hr IV TITRATE MARIA LUZ; Protocol


   Last Titration: 09/10/19 01:40 Dose:  18 munits/min, 108 mls/hr


Oxytocin 20 unit/ Lactated (Ringer's)  1,002 mls @ 60.12 mls/hr IV TITRATE MARIA LUZ; 

Protocol


   Last Titration: 09/10/19 11:40 Dose:  500 munits/min, 1,503 mls/hr


Cefazolin Sodium/Dextrose 2 gm (/ Premix)  50 mls @ 100 mls/hr IV ONETIME ONE


   Stop: 09/10/19 11:52


   Last Admin: 09/10/19 11:23 Dose:  100 mls/hr


Methylergonovine Maleate (Methergine)  0.2 mg IM Q4H STA


   Stop: 09/10/19 11:24


   Last Admin: 09/10/19 15:00 Dose:  0.2 mg


Methylergonovine Maleate (Methergine) Confirm Administered Dose 0.2 mg .ROUTE 

.STK-MED ONE


   Stop: 09/10/19 14:51


   Last Admin: 09/10/19 15:15 Dose:  Not Given


Metoclopramide HCl (Reglan)  10 mg IVPUSH ONETIME ONE


   Stop: 19 17:38


   Last Admin: 09/10/19 14:20 Dose:  Not Given


Misoprostol (Cytotec) Confirm Administered Dose 600 mcg .ROUTE .STK-MED ONE


   Stop: 09/10/19 10:52


   Last Admin: 09/10/19 11:39 Dose:  Not Given


Misoprostol (Cytotec)  600 mcg PO NOW STA


   Stop: 09/10/19 11:24


   Last Admin: 09/10/19 10:53 Dose:  600 mcg


Morphine Sulfate (Duramorph Pf) Confirm Administered Dose 10 mg .ROUTE .STK-MED 

ONE


   Stop: 19 17:56


Nalbuphine HCl (Nubain)  10 mg IVPUSH Q2H PRN


   PRN Reason: Pain


Nalbuphine HCl (Nubain)  10 mg IVPUSH Q2H PRN


   PRN Reason: Pain


Ondansetron HCl (Zofran) Confirm Administered Dose 4 mg .ROUTE .STK-MED ONE


   Stop: 19 17:57


Oxytocin (Pitocin) Confirm Administered Dose 10 unit .ROUTE .STK-MED ONE


   Stop: 19 17:55


Sodium Chloride (Saline Flush)  10 ml FLUSH ASDIRECTED PRN


   PRN Reason: Keep Vein Open


Sodium Chloride (Saline Flush)  10 ml FLUSH ASDIRECTED PRN


   PRN Reason: Keep Vein Open


Terbutaline Sulfate (Brethine)  0.25 mg SUBCUT ONETIME ONE


   Stop: 19 18:03


   Last Admin: 19 18:21 Dose:  0.25 mg


Tranexamic Acid (Cyklokapron) Confirm Administered Dose 1,000 mg .ROUTE .STK-

MED ONE


   Stop: 09/10/19 10:56


   Last Admin: 09/10/19 11:39 Dose:  Not Given


Tranexamic Acid (Cyklokapron)  1,000 mg IVPUSH ONETIME ONE


   Stop: 09/10/19 11:24


   Last Admin: 09/10/19 11:13 Dose:  1,000 mg











- Infant Interaction


Infant Disposition, Postpartum: [place] in Room with Family


Infant Interaction: Holding Infant


Infant Feeding:  Infant; Nursed Well


Support Person: Significant Other





- Postpartum Recovery Exam


Fundal Tone: Firm


Fundal Level: 1 Fingerbreadths Below Umbilicus


Fundal Placement: Midline


Lochia Amount: Small


Lochia Color: Rubra/Red


Perineum Description: Intact, Minimal Bruising/Swelling


Episiotomy/Laceration: None


Bladder Status: Voiding


Urinary Elimination: Voided





- Exam


General: Alert, Oriented, Cooperative


GI/Abdominal Exam: Soft, Non-Tender


Extremities: Normal Inspection


Skin: Warm, Dry, Intact





- Problem List & Annotations


(1) 36 weeks gestation of pregnancy


SNOMED Code(s): 07679467


   Code(s): Z3A.36 - 36 WEEKS GESTATION OF PREGNANCY   Status: Acute   Current 

Visit: Yes   





(2) Cholestasis


SNOMED Code(s): 43473688


   Code(s): K83.1 - OBSTRUCTION OF BILE DUCT   Status: Acute   Current Visit: 

Yes   





(3) Preeclampsia


SNOMED Code(s): 463345462


   Code(s): O14.90 - UNSPECIFIED PRE-ECLAMPSIA, UNSPECIFIED TRIMESTER   Status: 

Acute   Current Visit: Yes   


Qualifiers: 


   Trimester: third trimester   Qualified Code(s): O14.93 - Unspecified pre-

eclampsia, third trimester   





(4) History of 


SNOMED Code(s): 691726424


   Code(s): Z98.891 - HISTORY OF UTERINE SCAR FROM PREVIOUS SURGERY   Status: 

Acute   Current Visit: Yes   





(5) History of 


SNOMED Code(s): 586981749, 452081704


   Code(s): Z98.891 - HISTORY OF UTERINE SCAR FROM PREVIOUS SURGERY   Status: 

Acute   Current Visit: Yes   





(6) Breech presentation


SNOMED Code(s): 0118628


   Code(s): O32.1XX0 - MATERNAL CARE FOR BREECH PRESENTATION, UNSP   Status: 

Acute   Current Visit: Yes   


Qualifiers: 


   Fetus number: single or unspecified fetus   Qualified Code(s): O32.1XX0 - 

Maternal care for breech presentation, not applicable or unspecified   





(7) Successful external cephalic version


SNOMED Code(s): 21834252


   Code(s): HOJ1796 -    Status: Acute   Current Visit: Yes   





(8) , delivered, current hospitalization


SNOMED Code(s): 967349928


   Code(s): O34.219 - MATERNAL CARE FOR UNSP TYPE SCAR FROM PREVIOUS  

DEL   Status: Acute   Current Visit: Yes   





(9) Postpartum hemorrhage


SNOMED Code(s): 47913469


   Code(s): O72.1 - OTHER IMMEDIATE POSTPARTUM HEMORRHAGE   Status: Acute   

Current Visit: Yes   


Qualifiers: 


   Postpartum hemorrhage type: other immediate   Qualified Code(s): O72.1 - 

Other immediate postpartum hemorrhage   





- Problem List Review


Problem List Initiated/Reviewed/Updated: Yes





- My Orders


Last 24 Hours: 


My Active Orders





09/10/19 11:43


Activity as Tolerated [RC] PER UNIT ROUTINE 


Vital Signs [RC] 20,00,04,0800 


Acetaminophen [Tylenol]   650 mg PO Q4H PRN 


Docusate Sodium [Colace]   100 mg PO BID PRN 


Witch Hazel [Tucks]   1 pad TOP ASDIRECTED PRN 


Assess Lochia [WOMSER] Per Unit Routine 


Assess Uterine Involution [WOMSER] Per Unit Routine 


Breast Pump [WOMSER] Per Unit Routine 


Heat Therapy [OM.PC] PRN 


Ice Therapy [OM.PC] Per Unit Routine 


Perineal Care [OM.PC] Per Unit Routine 


Peripheral IV Discontinue [OM.PC] Routine 


Sitz Bath [OM.PC] Per Unit Routine 





09/10/19 15:00


Ibuprofen [Motrin]   600 mg PO Q6H PRN 





09/10/19 Lunch


Regular Diet [DIET] 





19 07:16


Lactated Ringers [Ringers, Lactated] 1,000 ml IV .BOLUS 





19 11:43


Heat Therapy [OM.PC] PRN 














- Assessment


Assessment:: 





33 y/o G6 now  PPD#1 from  at 36 3/7 wks after ECV and IOL for 

cholestasis/preeclampsia 





- Plan


Plan:: 





/PPH


* Routine cares


* Monitor bleeding closely 


* Breast feeding


* Hb with drop to 8.3 this AM.  Seems appropriate for hemorrhage yesterday.  

Patient requests IVF bolus as feels a little weak which seems reasonable.  

Reviewed indications for transfusion 


* Discharge tomorrow

## 2019-09-11 NOTE — [id number]
Post Anesthesia Note





- EVALUATION WITHIN 48HRS OF ANESTHETIC


Vital Signs in Normal Range: Yes


Patient Participated in Evaluation: Yes


Respiratory Function Stable: Yes


Airway Patent: Yes


Cardiovascular Function Stable: Yes


Hydration Status Stable: Yes


Pain Control Satisfactory: Yes


Nausea and Vomiting Control Satisfactory: Yes


Mental Status Recovered: Yes


Vital Signs: 


 Last Vital Signs











Temp  97.9 F   09/11/19 03:40


 


Pulse  88   09/11/19 03:40


 


Resp  16   09/11/19 03:40


 


BP  117/65   09/11/19 03:40


 


Pulse Ox  97   09/11/19 03:40

## 2019-09-12 VITALS — HEART RATE: 96 BPM | SYSTOLIC BLOOD PRESSURE: 123 MMHG | DIASTOLIC BLOOD PRESSURE: 68 MMHG

## 2019-09-12 NOTE — PCM.PNPP
- General Info


Date of Service: 19


Functional Status: Reports: Pain Controlled, Tolerating Diet, Ambulating, 

Urinating





- Review of Systems


General: Reports: No Symptoms


Pulmonary: Reports: No Symptoms


Cardiovascular: Reports: No Symptoms


Gastrointestinal: Reports: No Symptoms


Genitourinary: Reports: No Symptoms


Musculoskeletal: Reports: No Symptoms


Neurological: Reports: No Symptoms





- Patient Data


Vital Signs - Most Recent: 


 Last Vital Signs











Temp  36.7 C   19 05:08


 


Pulse  79   19 05:08


 


Resp  14   19 05:08


 


BP  98/48 L  19 05:08


 


Pulse Ox  98   19 05:08











Weight - Most Recent: 96.252 kg


I&O - Last 24 Hours: 


 Intake & Output











 19





 14:59 22:59 06:59


 


Intake Total 360 240 


 


Balance 360 240 











Lab Results - Last 24 Hours: 


 Laboratory Results - last 24 hr











  19 Range/Units





  05:38 


 


WBC  15.56 H  (3.98-10.04)  K/mm3


 


RBC  3.62 L  (3.98-5.22)  M/mm3


 


Hgb  8.2 L D  (11.2-15.7)  gm/L


 


Hct  27.4 L  (34.1-44.9)  %


 


MCV  75.7 L  (79.4-94.8)  fl


 


MCH  22.7 L  (25.6-32.2)  pg


 


MCHC  29.9 L  (32.2-35.5)  g/dl


 


RDW Std Deviation  47.2 H  (36.4-46.3)  fL


 


Plt Count  267  (182-369)  K/mm3


 


MPV  9.7  (9.4-12.3)  fl











Med Orders - Current: 


 Current Medications





Acetaminophen (Tylenol)  650 mg PO Q4H PRN


   PRN Reason: mild pain or fever


Docusate Sodium (Colace)  100 mg PO BID PRN


   PRN Reason: Constipation


Ibuprofen (Motrin)  600 mg PO Q6H PRN


   PRN Reason: Mild pain or fever


Witch Hazel (Tucks)  1 pad TOP ASDIRECTED PRN


   PRN Reason: Perineal Comfort Measure





Discontinued Medications





Bupivacaine HCl (Sensorcaine-Mpf 0.25%)  10 ml .ROUTE .STK-MED ONE


   Stop: 09/10/19 00:01


Carboprost Tromethamine (Hemabate Ds) Confirm Administered Dose 250 mcg .ROUTE 

.STK-MED ONE


   Stop: 09/10/19 10:58


   Last Admin: 09/10/19 11:39 Dose:  Not Given


Carboprost Tromethamine (Hemabate Ds)  250 mcg IM ONETIME ONE


   Stop: 09/10/19 11:24


   Last Admin: 09/10/19 11:00 Dose:  250 mcg


Cefazolin Sodium (Ancef) Confirm Administered Dose 2 gm .ROUTE .STK-MED ONE


   Stop: 19 17:57


Citric Acid/Sodium Citrate (Bicitra Solution)  30 ml PO ONETIME ONE


   Stop: 19 17:38


   Last Admin: 09/10/19 14:20 Dose:  Not Given


Ephedrine Sulfate (Ephedrine Sulfate)  5 mg IVPUSH ASDIRECTED PRN


   PRN Reason: Hypotension


Fentanyl (Sublimaze)  100 mcg EPIDUR Q3H PRN


   PRN Reason: Pain


   Last Admin: 09/10/19 05:42 Dose:  100 mcg


Fentanyl/Bupivacaine HCl (Fentanyl/Bupivacaine/Ns 2 Mcg-0.125% 100 Ml)  100 ml 

EPIDUR ASDIRECTED MARIA LUZ


   Last Admin: 09/10/19 05:42 Dose:  100 ml


Cefazolin Sodium/Dextrose 2 gm (/ Premix)  50 mls @ 100 mls/hr IV ONETIME ONE


   Stop: 19 18:06


   Last Admin: 09/10/19 14:20 Dose:  Not Given


Lactated Ringer's (Ringers, Lactated)  1,000 mls @ 125 mls/hr IV ASDIRECTED MARIA LUZ


   Last Admin: 09/10/19 11:41 Dose:  125 mls/hr


Oxytocin/Lactated Ringer's (Pitocin In Lr 10 Units/1,000 Ml)  10 unit in 1,000 

mls @ 100 mls/hr IV ASDIRECTED MARIA LUZ; Protocol


Oxytocin/Lactated Ringer's (Pitocin In Lr 10 Units/1,000 Ml)  10 unit in 1,000 

mls @ 12 mls/hr IV TITRATE MARIA LUZ; Protocol


   Last Titration: 09/10/19 01:40 Dose:  18 munits/min, 108 mls/hr


Oxytocin 20 unit/ Lactated (Ringer's)  1,002 mls @ 60.12 mls/hr IV TITRATE MARIA LUZ; 

Protocol


   Last Titration: 09/10/19 11:40 Dose:  500 munits/min, 1,503 mls/hr


Cefazolin Sodium/Dextrose 2 gm (/ Premix)  50 mls @ 100 mls/hr IV ONETIME ONE


   Stop: 09/10/19 11:52


   Last Admin: 09/10/19 11:23 Dose:  100 mls/hr


Lactated Ringer's (Ringers, Lactated)  1,000 mls @ 1,000 mls/hr IV .BOLUS ONE


   Stop: 19 08:15


   Last Admin: 19 08:41 Dose:  1,000 mls/hr


Methylergonovine Maleate (Methergine)  0.2 mg IM Q4H STA


   Stop: 09/10/19 11:24


   Last Admin: 09/10/19 15:00 Dose:  0.2 mg


Methylergonovine Maleate (Methergine) Confirm Administered Dose 0.2 mg .ROUTE 

.STK-MED ONE


   Stop: 09/10/19 14:51


   Last Admin: 09/10/19 15:15 Dose:  Not Given


Metoclopramide HCl (Reglan)  10 mg IVPUSH ONETIME ONE


   Stop: 19 17:38


   Last Admin: 09/10/19 14:20 Dose:  Not Given


Misoprostol (Cytotec) Confirm Administered Dose 600 mcg .ROUTE .STK-MED ONE


   Stop: 09/10/19 10:52


   Last Admin: 09/10/19 11:39 Dose:  Not Given


Misoprostol (Cytotec)  600 mcg PO NOW STA


   Stop: 09/10/19 11:24


   Last Admin: 09/10/19 10:53 Dose:  600 mcg


Morphine Sulfate (Duramorph Pf) Confirm Administered Dose 10 mg .ROUTE .STK-MED 

ONE


   Stop: 19 17:56


Nalbuphine HCl (Nubain)  10 mg IVPUSH Q2H PRN


   PRN Reason: Pain


Nalbuphine HCl (Nubain)  10 mg IVPUSH Q2H PRN


   PRN Reason: Pain


Ondansetron HCl (Zofran) Confirm Administered Dose 4 mg .ROUTE .STK-MED ONE


   Stop: 19 17:57


Oxytocin (Pitocin) Confirm Administered Dose 10 unit .ROUTE .STK-MED ONE


   Stop: 19 17:55


Sodium Chloride (Saline Flush)  10 ml FLUSH ASDIRECTED PRN


   PRN Reason: Keep Vein Open


Sodium Chloride (Saline Flush)  10 ml FLUSH ASDIRECTED PRN


   PRN Reason: Keep Vein Open


Terbutaline Sulfate (Brethine)  0.25 mg SUBCUT ONETIME ONE


   Stop: 19 18:03


   Last Admin: 19 18:21 Dose:  0.25 mg


Tranexamic Acid (Cyklokapron) Confirm Administered Dose 1,000 mg .ROUTE .STK-

MED ONE


   Stop: 09/10/19 10:56


   Last Admin: 09/10/19 11:39 Dose:  Not Given


Tranexamic Acid (Cyklokapron)  1,000 mg IVPUSH ONETIME ONE


   Stop: 09/10/19 11:24


   Last Admin: 09/10/19 11:13 Dose:  1,000 mg











- Infant Interaction


Infant Disposition, Postpartum:  in Room with Family


Infant Interaction: Holding Infant


Infant Feeding:  Infant; Nursed Well


Support Person: Significant Other





- Postpartum Recovery Exam


Fundal Tone: Firm


Fundal Level: At Umbilicus


Fundal Placement: Midline


Lochia Amount: Small


Lochia Color: Rubra/Red


Perineum Description: Intact, Minimal Bruising/Swelling


Episiotomy/Laceration: None


Bladder Status: Voiding


Urinary Elimination: Voided





- Exam


General: Alert, Oriented, Cooperative


GI/Abdominal Exam: Soft, Non-Tender


Extremities: Normal Inspection


Skin: Warm, Dry, Intact





- Problem List & Annotations


(1) 36 weeks gestation of pregnancy


SNOMED Code(s): 08020695


   Code(s): Z3A.36 - 36 WEEKS GESTATION OF PREGNANCY   Status: Acute   Current 

Visit: Yes   





(2) Cholestasis


SNOMED Code(s): 93438959


   Code(s): K83.1 - OBSTRUCTION OF BILE DUCT   Status: Acute   Current Visit: 

Yes   





(3) Preeclampsia


SNOMED Code(s): 738176742


   Code(s): O14.90 - UNSPECIFIED PRE-ECLAMPSIA, UNSPECIFIED TRIMESTER   Status: 

Acute   Current Visit: Yes   


Qualifiers: 


   Trimester: third trimester   Qualified Code(s): O14.93 - Unspecified pre-

eclampsia, third trimester   





(4) History of 


SNOMED Code(s): 083323766


   Code(s): Z98.891 - HISTORY OF UTERINE SCAR FROM PREVIOUS SURGERY   Status: 

Acute   Current Visit: Yes   





(5) History of 


SNOMED Code(s): 213001276, 213879499


   Code(s): Z98.891 - HISTORY OF UTERINE SCAR FROM PREVIOUS SURGERY   Status: 

Acute   Current Visit: Yes   





(6) Breech presentation


SNOMED Code(s): 1809992


   Code(s): O32.1XX0 - MATERNAL CARE FOR BREECH PRESENTATION, UNSP   Status: 

Acute   Current Visit: Yes   


Qualifiers: 


   Fetus number: single or unspecified fetus   Qualified Code(s): O32.1XX0 - 

Maternal care for breech presentation, not applicable or unspecified   





(7) Successful external cephalic version


SNOMED Code(s): 18697497


   Code(s): [id number] -    Status: Acute   Current Visit: Yes   





(8) , delivered, current hospitalization


SNOMED Code(s): 158881008


   Code(s): O34.219 - MATERNAL CARE FOR UNSP TYPE SCAR FROM PREVIOUS  

DEL   Status: Acute   Current Visit: Yes   





(9) Postpartum hemorrhage


SNOMED Code(s): 14460152


   Code(s): O72.1 - OTHER IMMEDIATE POSTPARTUM HEMORRHAGE   Status: Acute   

Current Visit: Yes   


Qualifiers: 


   Postpartum hemorrhage type: other immediate   Qualified Code(s): O72.1 - 

Other immediate postpartum hemorrhage   





- Problem List Review


Problem List Initiated/Reviewed/Updated: Yes





- My Orders


Last 24 Hours: 


My Active Orders





19 11:43


Heat Therapy [OM.PC] PRN 





19 06:50


Ready for Discharge [RC] PER UNIT ROUTINE 














- Assessment


Assessment:: 





33 y/o G6 now  PPD#2 from  at 36 3/7 wks after ECV and IOL for 

cholestasis/preeclampsia 





- Plan


Plan:: 





/PPH


* Routine cares


* Breast feeding


* Discharge today

## 2019-09-12 NOTE — PCM.DCSUM1
**Discharge Summary





- Discharge Data


Discharge Date: 19


Discharge Disposition: Home, Self-Care 01


Condition: Good





- Referral to Home Health


Primary Care Physician: 


Mai Villarreal MD








- Discharge Diagnosis/Problem(s)


(1) 36 weeks gestation of pregnancy


SNOMED Code(s): 24909898


   ICD Code: Z3A.36 - 36 WEEKS GESTATION OF PREGNANCY   Status: Acute   Current 

Visit: Yes   





(2) Cholestasis


SNOMED Code(s): 73473870


   ICD Code: K83.1 - OBSTRUCTION OF BILE DUCT   Status: Acute   Current Visit: 

Yes   





(3) Preeclampsia


SNOMED Code(s): 650167958


   ICD Code: O14.90 - UNSPECIFIED PRE-ECLAMPSIA, UNSPECIFIED TRIMESTER   Status

: Acute   Current Visit: Yes   


Qualifiers: 


   Trimester: third trimester   Qualified Code(s): O14.93 - Unspecified pre-

eclampsia, third trimester   





(4) History of 


SNOMED Code(s): 203560680


   ICD Code: Z98.891 - HISTORY OF UTERINE SCAR FROM PREVIOUS SURGERY   Status: 

Acute   Current Visit: Yes   





(5) History of 


SNOMED Code(s): 568044839, 436723715


   ICD Code: Z98.891 - HISTORY OF UTERINE SCAR FROM PREVIOUS SURGERY   Status: 

Acute   Current Visit: Yes   





(6) Breech presentation


SNOMED Code(s): 8405275


   ICD Code: O32.1XX0 - MATERNAL CARE FOR BREECH PRESENTATION, UNSP   Status: 

Acute   Current Visit: Yes   


Qualifiers: 


   Fetus number: single or unspecified fetus   Qualified Code(s): O32.1XX0 - 

Maternal care for breech presentation, not applicable or unspecified   





(7) Successful external cephalic version


SNOMED Code(s): 89562138


   ICD Code: YGA6402 -    Status: Acute   Current Visit: Yes   





(8) , delivered, current hospitalization


SNOMED Code(s): 410484133


   ICD Code: O34.219 - MATERNAL CARE FOR UNSP TYPE SCAR FROM PREVIOUS  

DEL   Status: Acute   Current Visit: Yes   





(9) Postpartum hemorrhage


SNOMED Code(s): 03062064


   ICD Code: O72.1 - OTHER IMMEDIATE POSTPARTUM HEMORRHAGE   Status: Acute   

Current Visit: Yes   


Qualifiers: 


   Postpartum hemorrhage type: other immediate   Qualified Code(s): O72.1 - 

Other immediate postpartum hemorrhage   





- Patient Summary/Data


Complications: None


Consults: 


None


Recommended Follow-up Testing/Procedures: 


Follow up in 3 weeks for postpartum check 


Hospital Course: 


Patient is a 34-year-old  at 36-2/7 weeks gestation who presents for 

delivery due to cholestasis of pregnancy and also now a new diagnosis of 

preeclampsia.  Baby noted to be breech.  Patient counseled on options and did 

elect to proceed with an external cephalic version.  This was successful.  See 

procedure note.  She was then induced with a combination of Pitocin and AROM.  

She progressed slowly, but well to complete dilation and underwent a  and 

PPH afterwards.  SEe delivery note for full details.  Postpartum she did well 

and was discharged home on PPD#2 





- Patient Instructions


Diet: Regular Diet as Tolerated


Activity: As Tolerated


Activity, Other: Pelvic Rest for 6 weeks


Driving: May Drive Today


Showering/Bathing: May Shower


Showering/Bathing, Other: May Bathe


Notify Provider of: Fever, Increased Pain, Swelling and Redness, Drainage, 

Nausea and/or Vomiting





- Discharge Plan


*PRESCRIPTION DRUG MONITORING PROGRAM REVIEWED*: Not Applicable


*COPY OF PRESCRIPTION DRUG MONITORING REPORT IN PATIENT LALITA: Not Applicable


Home Medications: 


 Home Meds





Pnv with Ca,No.71/Iron/Fa [Prenatal Vitamin Tablet] 1 tab PO DAILY 08/28/15 [

History]


Thyroid T3 Compound 70 mcg PO DAILY 08/28/15 [History]


Docusate Sodium [Colace] 100 mg PO BID PRN  cap 19 [Rx]


Ibuprofen [Motrin] 600 mg PO Q6H PRN  tablet 19 [Rx]








Referrals: 


Mai Villarreal MD [Primary Care Provider] -  (3 weeks for postpartum check)





- Discharge Summary/Plan Comment


DC Time >30 min.: No





- Patient Data


Vitals - Most Recent: 


 Last Vital Signs











Temp  36.7 C   19 05:08


 


Pulse  79   19 05:08


 


Resp  14   19 05:08


 


BP  98/48 L  19 05:08


 


Pulse Ox  98   19 05:08











Weight - Most Recent: 96.252 kg


I&O - Last 24 hours: 


 Intake & Output











 19





 14:59 22:59 06:59


 


Intake Total 360 240 


 


Balance 360 240 











Lab Results - Last 24 hrs: 


 Laboratory Results - last 24 hr











  19 Range/Units





  05:38 


 


WBC  15.56 H  (3.98-10.04)  K/mm3


 


RBC  3.62 L  (3.98-5.22)  M/mm3


 


Hgb  8.2 L D  (11.2-15.7)  gm/L


 


Hct  27.4 L  (34.1-44.9)  %


 


MCV  75.7 L  (79.4-94.8)  fl


 


MCH  22.7 L  (25.6-32.2)  pg


 


MCHC  29.9 L  (32.2-35.5)  g/dl


 


RDW Std Deviation  47.2 H  (36.4-46.3)  fL


 


Plt Count  267  (182-369)  K/mm3


 


MPV  9.7  (9.4-12.3)  fl











Med Orders - Current: 


 Current Medications





Acetaminophen (Tylenol)  650 mg PO Q4H PRN


   PRN Reason: mild pain or fever


Docusate Sodium (Colace)  100 mg PO BID PRN


   PRN Reason: Constipation


Ibuprofen (Motrin)  600 mg PO Q6H PRN


   PRN Reason: Mild pain or fever


Witch Hazel (Tucks)  1 pad TOP ASDIRECTED PRN


   PRN Reason: Perineal Comfort Measure





Discontinued Medications





Bupivacaine HCl (Sensorcaine-Mpf 0.25%)  10 ml .ROUTE .STK-MED ONE


   Stop: 09/10/19 00:01


Carboprost Tromethamine (Hemabate Ds) Confirm Administered Dose 250 mcg .ROUTE 

.STK-MED ONE


   Stop: 09/10/19 10:58


   Last Admin: 09/10/19 11:39 Dose:  Not Given


Carboprost Tromethamine (Hemabate Ds)  250 mcg IM ONETIME ONE


   Stop: 09/10/19 11:24


   Last Admin: 09/10/19 11:00 Dose:  250 mcg


Cefazolin Sodium (Ancef) Confirm Administered Dose 2 gm .ROUTE .STK-MED ONE


   Stop: 19 17:57


Citric Acid/Sodium Citrate (Bicitra Solution)  30 ml PO ONETIME ONE


   Stop: 19 17:38


   Last Admin: 09/10/19 14:20 Dose:  Not Given


Ephedrine Sulfate (Ephedrine Sulfate)  5 mg IVPUSH ASDIRECTED PRN


   PRN Reason: Hypotension


Fentanyl (Sublimaze)  100 mcg EPIDUR Q3H PRN


   PRN Reason: Pain


   Last Admin: 09/10/19 05:42 Dose:  100 mcg


Fentanyl/Bupivacaine HCl (Fentanyl/Bupivacaine/Ns 2 Mcg-0.125% 100 Ml)  100 ml 

EPIDUR ASDIRECTED MARIA LUZ


   Last Admin: 09/10/19 05:42 Dose:  100 ml


Cefazolin Sodium/Dextrose 2 gm (/ Premix)  50 mls @ 100 mls/hr IV ONETIME ONE


   Stop: 19 18:06


   Last Admin: 09/10/19 14:20 Dose:  Not Given


Lactated Ringer's (Ringers, Lactated)  1,000 mls @ 125 mls/hr IV ASDIRECTED MARIA LUZ


   Last Admin: 09/10/19 11:41 Dose:  125 mls/hr


Oxytocin/Lactated Ringer's (Pitocin In Lr 10 Units/1,000 Ml)  10 unit in 1,000 

mls @ 100 mls/hr IV ASDIRECTED MARIA LUZ; Protocol


Oxytocin/Lactated Ringer's (Pitocin In Lr 10 Units/1,000 Ml)  10 unit in 1,000 

mls @ 12 mls/hr IV TITRATE MARIA LUZ; Protocol


   Last Titration: 09/10/19 01:40 Dose:  18 munits/min, 108 mls/hr


Oxytocin 20 unit/ Lactated (Ringer's)  1,002 mls @ 60.12 mls/hr IV TITRATE MARIA LUZ; 

Protocol


   Last Titration: 09/10/19 11:40 Dose:  500 munits/min, 1,503 mls/hr


Cefazolin Sodium/Dextrose 2 gm (/ Premix)  50 mls @ 100 mls/hr IV ONETIME ONE


   Stop: 09/10/19 11:52


   Last Admin: 09/10/19 11:23 Dose:  100 mls/hr


Lactated Ringer's (Ringers, Lactated)  1,000 mls @ 1,000 mls/hr IV .BOLUS ONE


   Stop: 19 08:15


   Last Admin: 19 08:41 Dose:  1,000 mls/hr


Methylergonovine Maleate (Methergine)  0.2 mg IM Q4H STA


   Stop: 09/10/19 11:24


   Last Admin: 09/10/19 15:00 Dose:  0.2 mg


Methylergonovine Maleate (Methergine) Confirm Administered Dose 0.2 mg .ROUTE 

.STK-MED ONE


   Stop: 09/10/19 14:51


   Last Admin: 09/10/19 15:15 Dose:  Not Given


Metoclopramide HCl (Reglan)  10 mg IVPUSH ONETIME ONE


   Stop: 19 17:38


   Last Admin: 09/10/19 14:20 Dose:  Not Given


Misoprostol (Cytotec) Confirm Administered Dose 600 mcg .ROUTE .STK-MED ONE


   Stop: 09/10/19 10:52


   Last Admin: 09/10/19 11:39 Dose:  Not Given


Misoprostol (Cytotec)  600 mcg PO NOW STA


   Stop: 09/10/19 11:24


   Last Admin: 09/10/19 10:53 Dose:  600 mcg


Morphine Sulfate (Duramorph Pf) Confirm Administered Dose 10 mg .ROUTE .STK-MED 

ONE


   Stop: 19 17:56


Nalbuphine HCl (Nubain)  10 mg IVPUSH Q2H PRN


   PRN Reason: Pain


Nalbuphine HCl (Nubain)  10 mg IVPUSH Q2H PRN


   PRN Reason: Pain


Ondansetron HCl (Zofran) Confirm Administered Dose 4 mg .ROUTE .STK-MED ONE


   Stop: 19 17:57


Oxytocin (Pitocin) Confirm Administered Dose 10 unit .ROUTE .STK-MED ONE


   Stop: 19 17:55


Sodium Chloride (Saline Flush)  10 ml FLUSH ASDIRECTED PRN


   PRN Reason: Keep Vein Open


Sodium Chloride (Saline Flush)  10 ml FLUSH ASDIRECTED PRN


   PRN Reason: Keep Vein Open


Terbutaline Sulfate (Brethine)  0.25 mg SUBCUT ONETIME ONE


   Stop: 19 18:03


   Last Admin: 19 18:21 Dose:  0.25 mg


Tranexamic Acid (Cyklokapron) Confirm Administered Dose 1,000 mg .ROUTE .STK-

MED ONE


   Stop: 09/10/19 10:56


   Last Admin: 09/10/19 11:39 Dose:  Not Given


Tranexamic Acid (Cyklokapron)  1,000 mg IVPUSH ONETIME ONE


   Stop: 09/10/19 11:24


   Last Admin: 09/10/19 11:13 Dose:  1,000 mg

## 2020-10-05 ENCOUNTER — HOSPITAL ENCOUNTER (INPATIENT)
Dept: HOSPITAL 41 - JD.OB | Age: 35
LOS: 2 days | Discharge: HOME | End: 2020-10-07
Attending: OBSTETRICS & GYNECOLOGY | Admitting: OBSTETRICS & GYNECOLOGY
Payer: SELF-PAY

## 2020-10-05 DIAGNOSIS — Z20.828: ICD-10-CM

## 2020-10-05 DIAGNOSIS — K83.1: ICD-10-CM

## 2020-10-05 DIAGNOSIS — Z3A.36: ICD-10-CM

## 2020-10-05 DIAGNOSIS — O34.211: Primary | ICD-10-CM

## 2020-10-05 PROCEDURE — U0002 COVID-19 LAB TEST NON-CDC: HCPCS

## 2020-10-05 PROCEDURE — 10907ZC DRAINAGE OF AMNIOTIC FLUID, THERAPEUTIC FROM PRODUCTS OF CONCEPTION, VIA NATURAL OR ARTIFICIAL OPENING: ICD-10-PCS | Performed by: OBSTETRICS & GYNECOLOGY

## 2020-10-05 NOTE — PCM.PNLD
Labor Progress Note





- VS & Meds


Vital Signs: 


                                Last Vital Signs











Temp  36.8 C   10/05/20 07:13


 


Pulse  98   10/05/20 07:13


 


Resp  14   10/05/20 07:13


 


BP  127/90   10/05/20 07:13


 


Pulse Ox  98   10/05/20 07:13











Active Medications: 


                               Current Medications





Lactated Ringer's (Ringers, Lactated)  1,000 mls @ 100 mls/hr IV ASDIRECTED MARIA LUZ


   Last Admin: 10/05/20 08:05 Dose:  100 mls/hr


   Documented by: 


Oxytocin/Lactated Ringer's (Pitocin In Lr 10 Units/1,000 Ml)  10 unit in 1,000 

mls @ 12 mls/hr IV TITRATE MARIA LUZ; Protocol


   Last Titration: 10/05/20 11:10 Dose:  12 munits/min, 72 mls/hr


   Documented by: 


Oxytocin/Lactated Ringer's (Pitocin In Lr 10 Units/1,000 Ml)  10 unit in 1,000 

mls @ 500 mls/hr IV .CONTINUOUS MARIA LUZ


Lactated Ringer's (Ringers, Lactated)  1,000 mls @ 40 mls/hr IV ASDIRECTED MARIA LUZ


Nalbuphine HCl (Nubain)  10 mg IVPUSH Q2H PRN


   PRN Reason: Pain


Ondansetron HCl (Zofran)  4 mg IVPUSH Q4H PRN


   PRN Reason: Nausea/Vomiting


Sodium Chloride (Saline Flush)  10 ml FLUSH ASDIRECTED PRN


   PRN Reason: Keep Vein Open











- Uterine Contractions


Uterine Monitoring Mode: External [place]


Contraction Intensity: Mild


Uterine Resting Tone: Soft





- Fetal Monitoring


Fetal Monitor Mode: External Ultrasound


Fetal Heart Rate (FHR) Baseline: 150


Fetal Heart Rate (FHR) Variability: Moderate (6-25 bmp)


Fetal Accelerations: Present, 15x15


Fetal Decelerations: None


Fetal Strip Review: Category I





- Vaginal Exam


Dilation (cm): 3


Effacement (Percent): 50


Station: -2


Cervical Position: Posterior





- Labor Progress (Free Text)


Labor Progress: 





Doing well.  On 12 of pitocin.  AROM performed.  Continue present management

## 2020-10-05 NOTE — PCM.SN.2
- Free Text/Narrative


Note: 





2000


Patient has been on 30 units of pitocin for last hour.  Notes contractions now 

at a 6/10. Feels more downward pressure too.  Contractions about every 3-5 

minutes.  FHR; 150, moderate variability, + accelerations, rare early 

decelerations noted.





Mai Villarreal MD

## 2020-10-05 NOTE — PCM.DEL
L & D Note





- General Info


Date of Service: 10/05/20





- Delivery Note


Labor: Induced by ARM, Induced by Oxytocin


Delivery Outcome: Livebirth


Infant Delivery Method: Spontaneous Vaginal Delivery-Single


Infant Delivery Mode: Spontaneous


Birth Presentation: Left Occiput Anterior (MANUELA)


Nuchal Cord: None


Anesthesia Type: None


Amniotic Fluid Description: Clear


Episiotomy Type: None


Laceration: None


Placenta: Intact, Spontaneous


Cord: 3 Vessels


Estimated Blood Loss: 200


Resuscitation Needed: Yes


: Bulb Syringe, Stimulated, Warmed, [place] Used, Warmer Used


Delivery Comments (Free Text/Narrative):: 





Patient found to be complete and began pushing.  With maternal pushing effort 

fetal head delivered from MANUELA presentation.  No nuchal cord present.  With 

gentle downward traction the fetal shoulders and body delivered.  Infant placed 

on maternal abdomen.  Cord clamped and cut.  Cord blood obtained.  Placenta 

allowed time to separate and expelled intact.  Inspection of the perineum showed

no lacerations. 





- General Info


Date of Service: 10/05/20





- Patient Data


Vitals - Most Recent: 


                                Last Vital Signs











Temp  36.8 C   10/05/20 07:13


 


Pulse  98   10/05/20 07:13


 


Resp  14   10/05/20 07:13


 


BP  127/90   10/05/20 07:13


 


Pulse Ox  98   10/05/20 07:13











Weight - Most Recent: 95.481 kg


I&O - Last 24 Hours: 


                                 Intake & Output











 10/05/20 10/05/20 10/05/20





 06:59 14:59 22:59


 


Intake Total  200 3900


 


Balance  200 3900











Lab Results Last 24 Hours: 


                         Laboratory Results - last 24 hr











  10/05/20 10/05/20 10/05/20 Range/Units





  07:27 07:27 07:27 


 


WBC  8.34    (3.98-10.04)  K/mm3


 


RBC  4.59    (3.98-5.22)  M/mm3


 


Hgb  11.6  D    (11.2-15.7)  gm/dl


 


Hct  37.7    (34.1-44.9)  %


 


MCV  82.1  D    (79.4-94.8)  fl


 


MCH  25.3 L    (25.6-32.2)  pg


 


MCHC  30.8 L    (32.2-35.5)  g/dl


 


RDW Std Deviation  44.1    (36.4-46.3)  fL


 


Plt Count  266    (182-369)  K/mm3


 


MPV  9.2 L    (9.4-12.3)  fl


 


Sodium   138   (136-145)  mEq/L


 


Potassium   3.8   (3.5-5.1)  mEq/L


 


Chloride   104   ()  mEq/L


 


Carbon Dioxide   24   (21-32)  mEq/L


 


Anion Gap   13.8   (5-15)  


 


BUN   5 L   (7-18)  mg/dL


 


Creatinine   0.6   (0.55-1.02)  mg/dL


 


Est Cr Clr Drug Dosing   TNP   


 


Estimated GFR (MDRD)   > 60   (>60)  mL/min


 


BUN/Creatinine Ratio   8.3 L   (14-18)  


 


Glucose   84   ()  mg/dL


 


Calcium   8.6   (8.5-10.1)  mg/dL


 


Total Bilirubin   0.4   (0.2-1.0)  mg/dL


 


AST   20   (15-37)  U/L


 


ALT   51   (14-59)  U/L


 


Alkaline Phosphatase   146 H   ()  U/L


 


Total Protein   6.8   (6.4-8.2)  g/dl


 


Albumin   2.7 L   (3.4-5.0)  g/dl


 


Globulin   4.1   gm/dL


 


Albumin/Globulin Ratio   0.7 L   (1-2)  


 


RPR    Non-reactive  (NONREACTIVE)  


 


SARS-CoV-2 RNA (HARIS)     (NEGATIVE)  


 


Blood Type     


 


Gel Antibody Screen     














  10/05/20 10/05/20 Range/Units





  07:27 07:30 


 


WBC    (3.98-10.04)  K/mm3


 


RBC    (3.98-5.22)  M/mm3


 


Hgb    (11.2-15.7)  gm/dl


 


Hct    (34.1-44.9)  %


 


MCV    (79.4-94.8)  fl


 


MCH    (25.6-32.2)  pg


 


MCHC    (32.2-35.5)  g/dl


 


RDW Std Deviation    (36.4-46.3)  fL


 


Plt Count    (182-369)  K/mm3


 


MPV    (9.4-12.3)  fl


 


Sodium    (136-145)  mEq/L


 


Potassium    (3.5-5.1)  mEq/L


 


Chloride    ()  mEq/L


 


Carbon Dioxide    (21-32)  mEq/L


 


Anion Gap    (5-15)  


 


BUN    (7-18)  mg/dL


 


Creatinine    (0.55-1.02)  mg/dL


 


Est Cr Clr Drug Dosing    


 


Estimated GFR (MDRD)    (>60)  mL/min


 


BUN/Creatinine Ratio    (14-18)  


 


Glucose    ()  mg/dL


 


Calcium    (8.5-10.1)  mg/dL


 


Total Bilirubin    (0.2-1.0)  mg/dL


 


AST    (15-37)  U/L


 


ALT    (14-59)  U/L


 


Alkaline Phosphatase    ()  U/L


 


Total Protein    (6.4-8.2)  g/dl


 


Albumin    (3.4-5.0)  g/dl


 


Globulin    gm/dL


 


Albumin/Globulin Ratio    (1-2)  


 


RPR    (NONREACTIVE)  


 


SARS-CoV-2 RNA (HARIS)   Negative  (NEGATIVE)  


 


Blood Type  O POSITIVE   


 


Gel Antibody Screen  Negative   











Med Orders - Current: 


                               Current Medications





Lactated Ringer's (Ringers, Lactated)  1,000 mls @ 100 mls/hr IV ASDIRECTED MARIA LUZ


   Last Admin: 10/05/20 13:16 Dose:  100 mls/hr


   Documented by: 


Oxytocin/Lactated Ringer's (Pitocin In Lr 10 Units/1,000 Ml)  10 unit in 1,000 

mls @ 12 mls/hr IV TITRATE MARIA LUZ; Protocol


   Last Titration: 10/05/20 18:30 Dose:  28 munits/min, 168 mls/hr


   Documented by: 


Oxytocin/Lactated Ringer's (Pitocin In Lr 10 Units/1,000 Ml)  10 unit in 1,000 

mls @ 500 mls/hr IV .CONTINUOUS MARIA LUZ


Lactated Ringer's (Ringers, Lactated)  1,000 mls @ 40 mls/hr IV ASDIRECTED MARIA LUZ


Oxytocin/Lactated Ringer's (Pitocin In Lr 20 Units/1,000 Ml)  20 unit in 1,000 

mls @ 90 mls/hr IV TITRATE MARIA LUZ; Protocol


   Last Admin: 10/05/20 19:13 Dose:  90 mls/hr


   Documented by: 


Nalbuphine HCl (Nubain)  10 mg IVPUSH Q2H PRN


   PRN Reason: Pain


Ondansetron HCl (Zofran)  4 mg IVPUSH Q4H PRN


   PRN Reason: Nausea/Vomiting


Sodium Chloride (Saline Flush)  10 ml FLUSH ASDIRECTED PRN


   PRN Reason: Keep Vein Open





Discontinued Medications





Oxytocin/Lactated Ringer's (Pitocin In Lr 20 Units/1,000 Ml)  20 unit in 1,000 

mls @ 45 mls/hr IV TITRATE MARIA LUZ; Protocol











- Problem List & Annotations


(1) 36 weeks gestation of pregnancy


SNOMED Code(s): 16517897


   Code(s): Z3A.36 - 36 WEEKS GESTATION OF PREGNANCY   Status: Acute   Current 

Visit: No   





(2) Cholestasis


SNOMED Code(s): 06650195


   Code(s): K83.1 - OBSTRUCTION OF BILE DUCT   Status: Acute   Current Visit: No

  





(3) History of 


SNOMED Code(s): 402194570


   Code(s): Z98.891 - HISTORY OF UTERINE SCAR FROM PREVIOUS SURGERY   Status: 

Acute   Current Visit: No   





(4) History of 


SNOMED Code(s): 463493727, 108673947


   Code(s): Z98.891 - HISTORY OF UTERINE SCAR FROM PREVIOUS SURGERY   Status: 

Acute   Current Visit: No   





(5) , delivered, current hospitalization


SNOMED Code(s): 920694165


   Code(s): O34.219 - MATERNAL CARE FOR UNSP TYPE SCAR FROM PREVIOUS  

DEL   Status: Acute   Current Visit: No   





- Problem List Review


Problem List Initiated/Reviewed/Updated: Yes





- My Orders


Last 24 Hours: 


My Active Orders





10/05/20 Breakfast


Regular Diet [DIET] 





10/05/20 07:13


Patient Status [ADT] Routine 


Communication Order [RC] ASDIRECTED 


Fetal Non Stress Test [RC] PER UNIT ROUTINE 


Notify Provider [RC] ASDIRECTED 


Notify Provider [RC] PRN 


Peripheral IV Care [RC] .AS DIRECTED 


Up ad Malathi [RC] ASDIRECTED 


Vital Signs [RC] ASDIRECTED 


Nalbuphine [Nubain]   10 mg IVPUSH Q2H PRN 


Ondansetron [Zofran]   4 mg IVPUSH Q4H PRN 


Sodium Chloride 0.9% [Saline Flush]   10 ml FLUSH ASDIRECTED PRN 


Electronic Fetal Heart Tones Ext w TOCO [WOMSER] Routine 


Electronic Fetal Heart Tones Internal [WOMSER] Per Unit Routine 


Peripheral IV Insertion Adult [OM.PC] Routine 


Peripheral IV Insertion Adult [OM.PC] Routine 


Resuscitation Status Routine 





10/05/20 07:15


Lactated Ringers [Ringers, Lactated] 1,000 ml IV ASDIRECTED 


Lactated Ringers [Ringers, Lactated] 1,000 ml IV ASDIRECTED 


Oxytocin/Lactated Ringers [Pitocin in LR 10 Units/1,000 ML] 10 unit in 1,000 ml 

IV .CONTINUOUS 


Oxytocin/Lactated Ringers [Pitocin in LR 10 Units/1,000 ML] 10 unit in 1,000 ml 

IV TITRATE 





10/05/20 19:15


Oxytocin/Lactated Ringers [Pitocin in LR 20 Units/1,000 ML] 20 unit in 1,000 ml 

IV TITRATE 














- Assessment


Assessment:: 





PPD#0





- Plan


Plan:: 





* Routine cares


* Breast feeding


* Discharge home in 2 days

## 2020-10-05 NOTE — PCM.LDHP
L&D History of Present Illness





- General


Date of Service: 10/05/20


Admit Problem/Dx: 


                   Patient Status Order with Admit Dx/Problem





10/05/20 07:13


Patient Status [ADT] Routine 








                           Admission Diagnosis/Problem











Admission Diagnosis/Problem    Cholestasis during pregnancy














Source of Information: Patient


History Limitations: Reports: No Limitations





- History of Present Illness


Introduction:: 





Patient is a 35-year-old  107 at 36-2/7 weeks gestation who presents for 

induction of labor for cholestasis of pregnancy. Patient has had cholestasis in 

prior pregnancies.  This pregnancy began to feel similar itching concerns and 

bile acids were checked and found to be at a value of 18 about a month ago.  Has

 done well on ursodiol since that time.  Feeling well today.  Getting good fetal

 movement.  No signs or symptoms of labor








- Related Data


Allergies/Adverse Reactions: 


                                    Allergies











Allergy/AdvReac Type Severity Reaction Status Date / Time


 


lead Allergy  PT SPECIFIC Verified 19 17:18


 


mercury (elemental) Allergy  PT SPECIFIC Verified 19 17:18


 


codeine AdvReac  Seizure Verified 19 17:18


 


oxycodone AdvReac  Seizure Verified 19 17:18


 


oxycodone HCl [From Percocet] AdvReac  Seizure Verified 19 17:18


 


penicillin G AdvReac  Seizure Verified 19 17:18











Home Medications: 


                                    Home Meds





Pnv with Ca,No.71/Iron/Fa [Prenatal Vitamin Tablet] 1 tab PO DAILY 08/28/15 

[History]


Thyroid T3 Compound 70 mcg PO DAILY 08/28/15 [History]


Docusate Sodium [Colace] 100 mg PO BID PRN  cap 19 [Rx]


Ibuprofen [Motrin] 600 mg PO Q6H PRN  tablet 19 [Rx]











Past Medical History


OB/GYN History: Reports: Pregnancy


: 7


Para: 6 (7 living children)


Other OB/BYN History:  - .   - .   - .   - 

.   - , breech twins.  2019 - 


Psychiatric History: Reports: Anxiety





- Past Surgical History


GI Surgical History: Reports: Cholecystectomy


Female  Surgical History: Reports:  Section (, ), Cystectomy, 

Other (See Below) (diagnostic lap)





Social & Family History





- Family History


Family Medical History: Noncontributory





- Tobacco Use


Smoking Status *Q: Never Smoker





- Caffeine Use


Caffeine Use: Reports: None





- Alcohol Use


Alcohol Use History: No





- Recreational Drug Use


Recreational Drug Use: No





H&P Review of Systems





- Review of Systems:


Review Of Systems: See Below


General: Reports: No Symptoms


Pulmonary: Reports: No Symptoms


Cardiovascular: Reports: No Symptoms


Gastrointestinal: Reports: Anorexia


Genitourinary: Reports: No Symptoms


Musculoskeletal: Reports: No Symptoms


Psychiatric: Reports: No Symptoms


Neurological: Reports: No Symptoms





L&D Exam





- Exam


Exam: See Below





- OB Specific


Contraction Intensity: Irritability


Fetal Movement: Active


Fetal Heart Tones: Present


Fetal Heart Tones per Min: 155


Fetal Heart Rate (FHR) Variability: Moderate (6-25 bmp)


Birth Presentation: Vertex





- Browne Score


Browne Score Cervix Position: Posterior


Browne Score Consistency: Soft


Browne Score Effacement: 51-70%


Browne Score Dilation: 1-2 cm (2-3)


Browne Score Infant's Station: -2


Browne Score Total: 6





- Exam


General: Alert, Oriented, Cooperative


Lungs: Clear to Auscultation, Normal Respiratory Effort


Cardiovascular: Regular Rate, Regular Rhythm


GI/Abdominal Exam: Soft, Non-Tender


Genitourinary: Normal external exam


Extremities: Normal Inspection


Skin: Warm, Dry, Intact





- Patient Data


Result Diagrams: 


                                 10/05/20 07:27





                                 10/05/20 07:27





- Problem List


(1) 36 weeks gestation of pregnancy


SNOMED Code(s): 19457355


   ICD Code: Z3A.36 - 36 WEEKS GESTATION OF PREGNANCY   Status: Acute   Current 

Visit: No   





(2) Cholestasis


SNOMED Code(s): 18781080


   ICD Code: K83.1 - OBSTRUCTION OF BILE DUCT   Status: Acute   Current Visit: 

No   





(3) History of 


SNOMED Code(s): 250676491


   ICD Code: Z98.891 - HISTORY OF UTERINE SCAR FROM PREVIOUS SURGERY   Status: 

Acute   Current Visit: No   





(4) History of 


SNOMED Code(s): 621324253, 402037294


   ICD Code: Z98.891 - HISTORY OF UTERINE SCAR FROM PREVIOUS SURGERY   Status: 

Acute   Current Visit: No   


Problem List Initiated/Reviewed/Updated: Yes


Orders Last 24hrs: 


                               Active Orders 24 hr











 Category Date Time Status


 


 Patient Status [ADT] Routine ADT  10/05/20 07:13 Ordered


 


 Communication Order [RC] ASDIRECTED Care  10/05/20 07:13 Ordered


 


 Communication Order [RC] ASDIRECTED Care  10/05/20 07:13 Ordered


 


 Communication Order [RC] ASDIRECTED Care  10/05/20 07:13 Ordered


 


 Fetal Heart Tones [RC] ASDIRECTED Care  10/05/20 07:13 Ordered


 


 Fetal Non Stress Test [RC] PER UNIT ROUTINE Care  10/05/20 07:13 Ordered


 


 Notify Provider [RC] ASDIRECTED Care  10/05/20 07:13 Ordered


 


 Notify Provider [RC] PRN Care  10/05/20 07:13 Ordered


 


 Peripheral IV Care [RC] .AS DIRECTED Care  10/05/20 07:13 Ordered


 


 Up ad Malathi [RC] ASDIRECTED Care  10/05/20 07:13 Ordered


 


 Vaginal Exam [RC] ASDIRECTED Care  10/05/20 07:13 Ordered


 


 Vital Signs [RC] ASDIRECTED Care  10/05/20 07:13 Ordered


 


 Regular Diet [DIET] Diet  10/05/20 Breakfast Ordered


 


 CBC W/O DIFF,HEMOGRAM [HEME] Routine Lab  10/05/20 07:13 Ordered


 


 COMPREHENSIVE METABOLIC PN,CMP [CHEM] Routine Lab  10/05/20 07:13 Ordered


 


 CORONAVIRUS COVID-19 HARIS [MOLEC] Routine Lab  10/05/20 07:16 Ordered


 


 RAPID PLASMA REAGIN,RPR [CHEM] Routine Lab  10/05/20 07:13 Ordered


 


 TYPE AND SCREEN [BBK] Routine Lab  10/05/20 07:13 Ordered


 


 Lactated Ringers [Ringers, Lactated] 1,000 ml Med  10/05/20 07:15 Ordered





 IV ASDIRECTED   


 


 Lactated Ringers [Ringers, Lactated] 1,000 ml Med  10/05/20 07:15 Ordered





 IV ASDIRECTED   


 


 Nalbuphine [Nubain] Med  10/05/20 07:13 Ordered





 10 mg IVPUSH Q2H PRN   


 


 Ondansetron [Zofran] Med  10/05/20 07:13 Ordered





 4 mg IVPUSH Q4H PRN   


 


 Oxytocin/Lactated Ringers [Pitocin in LR 10 Units/1,000 Med  10/05/20 07:15 

Ordered





 ML]   





 10 unit in 1,000 ml IV .CONTINUOUS   


 


 Oxytocin/Lactated Ringers [Pitocin in LR 10 Units/1,000 Med  10/05/20 07:15 

Ordered





 ML]   





 10 unit in 1,000 ml IV TITRATE   


 


 Sodium Chloride 0.9% [Saline Flush] Med  10/05/20 07:13 Ordered





 10 ml FLUSH ASDIRECTED PRN   


 


 Electronic Fetal Heart Tones Ext w TOCO [WOMSER] Oth  10/05/20 07:13 Ordered





 Routine   


 


 Electronic Fetal Heart Tones Internal [WOMSER] Per Unit Oth  10/05/20 07:13 Or

dered





 Routine   


 


 Peripheral IV Insertion Adult [OM.PC] Routine Oth  10/05/20 07:13 Ordered


 


 Peripheral IV Insertion Adult [OM.PC] Routine Oth  10/05/20 07:13 Ordered


 


 Resuscitation Status Routine Resus Stat  10/05/20 07:13 Ordered








                                Medication Orders





Lactated Ringer's (Ringers, Lactated)  1,000 mls @ 100 mls/hr IV ASDIRECTED MARIA LUZ


Oxytocin/Lactated Ringer's (Pitocin In Lr 10 Units/1,000 Ml)  10 unit in 1,000 

mls @ 12 mls/hr IV TITRATE MARIA LUZ; Protocol


Oxytocin/Lactated Ringer's (Pitocin In Lr 10 Units/1,000 Ml)  10 unit in 1,000 

mls @ 500 mls/hr IV .CONTINUOUS MARIA LUZ


Lactated Ringer's (Ringers, Lactated)  1,000 mls @ 40 mls/hr IV ASDIRECTED MARIA LUZ


Nalbuphine HCl (Nubain)  10 mg IVPUSH Q2H PRN


   PRN Reason: Pain


Ondansetron HCl (Zofran)  4 mg IVPUSH Q4H PRN


   PRN Reason: Nausea/Vomiting


Sodium Chloride (Saline Flush)  10 ml FLUSH ASDIRECTED PRN


   PRN Reason: Keep Vein Open








Assessment/Plan Comment:: 





* Labs to be done including full CMP for history of cholestasis 


* GBS negative, no need for antibiotics 


* Pitocin for IOL with AROM as able 


* Pain management per patient preference 


* Anticipate  


* Peds to be made aware, Pt did receive Betamethasone on 10/1 and 10/2

## 2020-10-05 NOTE — PCM.SN.2
- Free Text/Narrative


Note: 





1900





In to see patient.  Rates contractions still as a 5/10, but does think in the 

last 30 minutes they have become more uncomfortable.  Just about to turn up to 

30 of pitocin.  FHR is 150, moderate variability, + accelerations, no 

decelerations.  Discussed that if no change at 30 options would be for a washout

vs proceeding with RLTCS.  Patient expressed understanding.  Will assess again 

in 1-2 hours





Mai Villarreal MD

## 2020-10-05 NOTE — PCM.PNLD
Labor Progress Note





- VS & Meds


Vital Signs: 


                                Last Vital Signs











Temp  36.8 C   10/05/20 07:13


 


Pulse  98   10/05/20 07:13


 


Resp  14   10/05/20 07:13


 


BP  127/90   10/05/20 07:13


 


Pulse Ox  98   10/05/20 07:13











Active Medications: 


                               Current Medications





Lactated Ringer's (Ringers, Lactated)  1,000 mls @ 100 mls/hr IV ASDIRECTED MARIA LUZ


   Last Admin: 10/05/20 13:16 Dose:  100 mls/hr


   Documented by: 


Oxytocin/Lactated Ringer's (Pitocin In Lr 10 Units/1,000 Ml)  10 unit in 1,000 

mls @ 12 mls/hr IV TITRATE MARIA LUZ; Protocol


   Last Titration: 10/05/20 18:30 Dose:  28 munits/min, 168 mls/hr


   Documented by: 


Oxytocin/Lactated Ringer's (Pitocin In Lr 10 Units/1,000 Ml)  10 unit in 1,000 

mls @ 500 mls/hr IV .CONTINUOUS MARIA LUZ


Lactated Ringer's (Ringers, Lactated)  1,000 mls @ 40 mls/hr IV ASDIRECTED MARIA LUZ


Oxytocin/Lactated Ringer's (Pitocin In Lr 20 Units/1,000 Ml)  20 unit in 1,000 

mls @ 90 mls/hr IV TITRATE MARIA LUZ; Protocol


   Last Admin: 10/05/20 19:13 Dose:  90 mls/hr


   Documented by: 


Nalbuphine HCl (Nubain)  10 mg IVPUSH Q2H PRN


   PRN Reason: Pain


Ondansetron HCl (Zofran)  4 mg IVPUSH Q4H PRN


   PRN Reason: Nausea/Vomiting


Sodium Chloride (Saline Flush)  10 ml FLUSH ASDIRECTED PRN


   PRN Reason: Keep Vein Open





Discontinued Medications





Oxytocin/Lactated Ringer's (Pitocin In Lr 20 Units/1,000 Ml)  20 unit in 1,000 

mls @ 45 mls/hr IV TITRATE MARIA LUZ; Protocol











- Uterine Contractions


Uterine Monitoring Mode: External [place]


Contraction Intensity: Moderate to Strong


Uterine Resting Tone: Soft





- Fetal Monitoring


Fetal Monitor Mode: External Ultrasound


Fetal Heart Rate (FHR) Baseline: 155


Fetal Heart Rate (FHR) Variability: Moderate (6-25 bmp)


Fetal Accelerations: Present, 15x15


Fetal Decelerations: None


Fetal Strip Review: Category I





- Vaginal Exam


Dilation (cm): 5-6


Effacement (Percent): 75


Station: -1


Cervical Position: Midposition





- Labor Progress (Free Text)


Labor Progress: 





Has been on 30 of pitocin since about 1900.  Has finally become more 

uncomfortable and actually looks to be more in labor.  Exam now 5-6, 75%, -1.  

Significant change from last exam at 1700.  Will continue to monitor closely and

work towards vaginal delivery

## 2020-10-05 NOTE — PCM.PNLD
Labor Progress Note





- VS & Meds


Vital Signs: 


                                Last Vital Signs











Temp  36.8 C   10/05/20 07:13


 


Pulse  98   10/05/20 07:13


 


Resp  14   10/05/20 07:13


 


BP  127/90   10/05/20 07:13


 


Pulse Ox  98   10/05/20 07:13











Active Medications: 


                               Current Medications





Lactated Ringer's (Ringers, Lactated)  1,000 mls @ 100 mls/hr IV ASDIRECTED MARIA LUZ


   Last Admin: 10/05/20 13:16 Dose:  100 mls/hr


   Documented by: 


Oxytocin/Lactated Ringer's (Pitocin In Lr 10 Units/1,000 Ml)  10 unit in 1,000 

mls @ 12 mls/hr IV TITRATE MARIA LUZ; Protocol


   Last Titration: 10/05/20 16:40 Dose:  22 munits/min, 132 mls/hr


   Documented by: 


Oxytocin/Lactated Ringer's (Pitocin In Lr 10 Units/1,000 Ml)  10 unit in 1,000 

mls @ 500 mls/hr IV .CONTINUOUS MARIA LUZ


Lactated Ringer's (Ringers, Lactated)  1,000 mls @ 40 mls/hr IV ASDIRECTED MARIA LUZ


Nalbuphine HCl (Nubain)  10 mg IVPUSH Q2H PRN


   PRN Reason: Pain


Ondansetron HCl (Zofran)  4 mg IVPUSH Q4H PRN


   PRN Reason: Nausea/Vomiting


Sodium Chloride (Saline Flush)  10 ml FLUSH ASDIRECTED PRN


   PRN Reason: Keep Vein Open











- Uterine Contractions


Uterine Monitoring Mode: External [place]


Contraction Intensity: Mild to Moderate


Uterine Resting Tone: Soft





- Fetal Monitoring


Fetal Monitor Mode: External Ultrasound


Fetal Heart Rate (FHR) Baseline: 155


Fetal Heart Rate (FHR) Variability: Moderate (6-25 bmp)


Fetal Accelerations: Present, 15x15


Fetal Decelerations: None


Fetal Strip Review: Category I





- Vaginal Exam


Dilation (cm): 3-4


Effacement (Percent): 50


Station: -2


Cervical Position: Posterior





- Labor Progress (Free Text)


Labor Progress: 





Doing well.  Pitocin at 20.  Rates contractions as a 5/10.  Contractions every 3

to 10 minutes.  Will go up to 30 of pitocin if needed.  Reviewed possibility of 

IUPC at future check if no change.  She agrees.  No other concerns currently

## 2020-10-06 NOTE — PCM.PNPP
- General Info


Date of Service: 10/06/20


Functional Status: Reports: Pain Controlled, Tolerating Diet, Ambulating, 

Urinating





- Review of Systems


General: Reports: No Symptoms


Pulmonary: Reports: No Symptoms


Cardiovascular: Reports: No Symptoms


Gastrointestinal: Reports: Abdominal Pain (cramping worse this postpartum time. 

Not well controlled with tylenol / ibuprofen )


Genitourinary: Reports: No Symptoms


Musculoskeletal: Reports: No Symptoms


Neurological: Reports: No Symptoms





- Patient Data


Vital Signs - Most Recent: 


                                Last Vital Signs











Temp  36.7 C   10/06/20 02:54


 


Pulse  93   10/06/20 02:54


 


Resp  16   10/06/20 02:54


 


BP  120/66   10/06/20 02:54


 


Pulse Ox  99   10/06/20 02:54











Weight - Most Recent: 95.481 kg


I&O - Last 24 Hours: 


                                 Intake & Output











 10/05/20 10/06/20 10/06/20





 22:59 06:59 14:59


 


Intake Total 3900 1000 


 


Balance 3900 1000 











Lab Results - Last 24 Hours: 


                         Laboratory Results - last 24 hr











  10/05/20 10/05/20 10/05/20 Range/Units





  07:27 07:27 07:27 


 


WBC  8.34    (3.98-10.04)  K/mm3


 


RBC  4.59    (3.98-5.22)  M/mm3


 


Hgb  11.6  D    (11.2-15.7)  gm/dl


 


Hct  37.7    (34.1-44.9)  %


 


MCV  82.1  D    (79.4-94.8)  fl


 


MCH  25.3 L    (25.6-32.2)  pg


 


MCHC  30.8 L    (32.2-35.5)  g/dl


 


RDW Std Deviation  44.1    (36.4-46.3)  fL


 


Plt Count  266    (182-369)  K/mm3


 


MPV  9.2 L    (9.4-12.3)  fl


 


Sodium   138   (136-145)  mEq/L


 


Potassium   3.8   (3.5-5.1)  mEq/L


 


Chloride   104   ()  mEq/L


 


Carbon Dioxide   24   (21-32)  mEq/L


 


Anion Gap   13.8   (5-15)  


 


BUN   5 L   (7-18)  mg/dL


 


Creatinine   0.6   (0.55-1.02)  mg/dL


 


Est Cr Clr Drug Dosing   TNP   


 


Estimated GFR (MDRD)   > 60   (>60)  mL/min


 


BUN/Creatinine Ratio   8.3 L   (14-18)  


 


Glucose   84   ()  mg/dL


 


Calcium   8.6   (8.5-10.1)  mg/dL


 


Total Bilirubin   0.4   (0.2-1.0)  mg/dL


 


AST   20   (15-37)  U/L


 


ALT   51   (14-59)  U/L


 


Alkaline Phosphatase   146 H   ()  U/L


 


Total Protein   6.8   (6.4-8.2)  g/dl


 


Albumin   2.7 L   (3.4-5.0)  g/dl


 


Globulin   4.1   gm/dL


 


Albumin/Globulin Ratio   0.7 L   (1-2)  


 


RPR    Non-reactive  (NONREACTIVE)  


 


SARS-CoV-2 RNA (HARIS)     (NEGATIVE)  


 


Blood Type     


 


Gel Antibody Screen     














  10/05/20 10/05/20 Range/Units





  07:27 07:30 


 


WBC    (3.98-10.04)  K/mm3


 


RBC    (3.98-5.22)  M/mm3


 


Hgb    (11.2-15.7)  gm/dl


 


Hct    (34.1-44.9)  %


 


MCV    (79.4-94.8)  fl


 


MCH    (25.6-32.2)  pg


 


MCHC    (32.2-35.5)  g/dl


 


RDW Std Deviation    (36.4-46.3)  fL


 


Plt Count    (182-369)  K/mm3


 


MPV    (9.4-12.3)  fl


 


Sodium    (136-145)  mEq/L


 


Potassium    (3.5-5.1)  mEq/L


 


Chloride    ()  mEq/L


 


Carbon Dioxide    (21-32)  mEq/L


 


Anion Gap    (5-15)  


 


BUN    (7-18)  mg/dL


 


Creatinine    (0.55-1.02)  mg/dL


 


Est Cr Clr Drug Dosing    


 


Estimated GFR (MDRD)    (>60)  mL/min


 


BUN/Creatinine Ratio    (14-18)  


 


Glucose    ()  mg/dL


 


Calcium    (8.5-10.1)  mg/dL


 


Total Bilirubin    (0.2-1.0)  mg/dL


 


AST    (15-37)  U/L


 


ALT    (14-59)  U/L


 


Alkaline Phosphatase    ()  U/L


 


Total Protein    (6.4-8.2)  g/dl


 


Albumin    (3.4-5.0)  g/dl


 


Globulin    gm/dL


 


Albumin/Globulin Ratio    (1-2)  


 


RPR    (NONREACTIVE)  


 


SARS-CoV-2 RNA (HARIS)   Negative  (NEGATIVE)  


 


Blood Type  O POSITIVE   


 


Gel Antibody Screen  Negative   











Med Orders - Current: 


                               Current Medications





Acetaminophen (Tylenol)  650 mg PO Q4H PRN


   PRN Reason: mild pain or fever


Benzocaine/Menthol (Dermoplast Pain Relief Spray)  0 gm TOP ASDIRECTED PRN


   PRN Reason: Perineal Comfort Measure


   Last Admin: 10/06/20 00:10 Dose:  1 applic


   Documented by: 


Docusate Sodium (Colace)  100 mg PO BID PRN


   PRN Reason: Constipation


Ibuprofen (Motrin)  600 mg PO Q6H PRN


   PRN Reason: Mild pain or fever


   Last Admin: 10/06/20 07:05 Dose:  600 mg


   Documented by: 


[facility] [place] ([facility]  1 pad TOP ASDIRECTED PRN


   PRN Reason: Perineal Comfort Measure


   Last Admin: 10/06/20 00:09 Dose:  1 applic


   Documented by: 





Discontinued Medications





Lactated Ringer's (Ringers, Lactated)  1,000 mls @ 100 mls/hr IV ASDIRECTED MARIA LUZ


   Last Admin: 10/05/20 13:16 Dose:  100 mls/hr


   Documented by: 


Oxytocin/Lactated Ringer's (Pitocin In Lr 10 Units/1,000 Ml)  10 unit in 1,000 

mls @ 12 mls/hr IV TITRATE MARIA LUZ; Protocol


   Last Titration: 10/05/20 18:30 Dose:  28 munits/min, 168 mls/hr


   Documented by: 


Oxytocin/Lactated Ringer's (Pitocin In Lr 10 Units/1,000 Ml)  10 unit in 1,000 

mls @ 500 mls/hr IV .CONTINUOUS MARIA LUZ


Lactated Ringer's (Ringers, Lactated)  1,000 mls @ 40 mls/hr IV ASDIRECTED MARIA LUZ


Oxytocin/Lactated Ringer's (Pitocin In Lr 20 Units/1,000 Ml)  20 unit in 1,000 

mls @ 45 mls/hr IV TITRATE MARIA LUZ; Protocol


Oxytocin/Lactated Ringer's (Pitocin In Lr 20 Units/1,000 Ml)  20 unit in 1,000 

mls @ 90 mls/hr IV TITRATE MARIA LUZ; Protocol


   Last Admin: 10/05/20 19:13 Dose:  90 mls/hr


   Documented by: 


Nalbuphine HCl (Nubain)  10 mg IVPUSH Q2H PRN


   PRN Reason: Pain


Ondansetron HCl (Zofran)  4 mg IVPUSH Q4H PRN


   PRN Reason: Nausea/Vomiting


Sodium Chloride (Saline Flush)  10 ml FLUSH ASDIRECTED PRN


   PRN Reason: Keep Vein Open











- Infant Interaction


Infant Disposition, Postpartum:  in Room with Family


Infant Interaction: Holding Infant


Infant Feeding:  Infant; Nursed Well


Support Person: Significant Other





- Postpartum Recovery Exam


Fundal Tone: Firm


Fundal Level: At Umbilicus


Fundal Placement: Midline


Lochia Amount: Moderate


Lochia Color: Rubra/Red


Perineum Description: Intact, Minimal Bruising/Swelling


Bladder Status: Voiding


Urinary Elimination: Voided





- Exam


General: Alert, Oriented, Cooperative


GI/Abdominal Exam: Soft, Non-Tender


Extremities: Normal Inspection


Skin: Warm, Dry, Intact





- Problem List & Annotations


(1) 36 weeks gestation of pregnancy


SNOMED Code(s): 68931470


   Code(s): Z3A.36 - 36 WEEKS GESTATION OF PREGNANCY   Status: Acute   Current 

Visit: No   





(2) Cholestasis


SNOMED Code(s): 32751634


   Code(s): K83.1 - OBSTRUCTION OF BILE DUCT   Status: Acute   Current Visit: No

  





(3) History of 


SNOMED Code(s): 932010275


   Code(s): Z98.891 - HISTORY OF UTERINE SCAR FROM PREVIOUS SURGERY   Status: 

Acute   Current Visit: No   





(4) History of 


SNOMED Code(s): 373583925, 057314816


   Code(s): Z98.891 - HISTORY OF UTERINE SCAR FROM PREVIOUS SURGERY   Status: 

Acute   Current Visit: No   





(5) , delivered, current hospitalization


SNOMED Code(s): 544548237


   Code(s): O34.219 - MATERNAL CARE FOR UNSP TYPE SCAR FROM PREVIOUS  

DEL   Status: Acute   Current Visit: No   





- Problem List Review


Problem List Initiated/Reviewed/Updated: Yes





- My Orders


Last 24 Hours: 


My Active Orders





10/05/20 Breakfast


Regular Diet [DIET] 





10/05/20 07:13


Peripheral IV Care [RC] Q2HR 


Resuscitation Status Routine 





10/05/20 23:07


Acetaminophen [TylenoL]   650 mg PO Q4H PRN 


Benzocaine/Menthol [Dermoplast Pain Relief Spray]   See Dose Instructions  TOP 

ASDIRECTED PRN 


Docusate Sodium [Colace]   100 mg PO BID PRN 


Ibuprofen [Motrin]   600 mg PO Q6H PRN 


witch hazeL [Tucks]   1 pad TOP ASDIRECTED PRN 


Heat Therapy [OM.PC] PRN 





10/05/20 23:07


Activity as Tolerated [RC] PER UNIT ROUTINE 


Vital Signs [RC] 03,09,15,21 


Assess Lochia [WOMSER] Per Unit Routine 


Assess Uterine Involution [WOMSER] Per Unit Routine 


Breast Pump [WOMSER] Per Unit Routine 


Ice Therapy [OM.PC] Per Unit Routine 


Perineal Care [OM.PC] Per Unit Routine 


Peripheral IV Discontinue [OM.PC] Routine 


Sitz Bath [OM.PC] Per Unit Routine 





10/06/20 23:07


Heat Therapy [OM.PC] PRN 














- Assessment


Assessment:: 





PPD#1





- Plan


Plan:: 





* Routine cares


* Breast feeding


* Will order percocet for patient as she feels her cramping has been a little 

  more significant than previous 


* Discharge home tomorrow

## 2020-10-07 VITALS — HEART RATE: 91 BPM | DIASTOLIC BLOOD PRESSURE: 81 MMHG | SYSTOLIC BLOOD PRESSURE: 137 MMHG

## 2020-10-07 NOTE — PCM.PNPP
- General Info


Date of Service: 10/07/20


Functional Status: Reports: Pain Controlled, Tolerating Diet, Ambulating, 

Urinating





- Review of Systems


General: Reports: No Symptoms


Pulmonary: Reports: No Symptoms


Cardiovascular: Reports: No Symptoms


Gastrointestinal: Reports: Abdominal Pain (better controlled today )


Genitourinary: Reports: No Symptoms


Musculoskeletal: Reports: No Symptoms


Neurological: Reports: No Symptoms





- Patient Data


Vital Signs - Most Recent: 


                                Last Vital Signs











Temp  36.7 C   10/07/20 03:16


 


Pulse  79   10/07/20 03:16


 


Resp  16   10/07/20 03:16


 


BP  118/59 L  10/07/20 03:16


 


Pulse Ox  98   10/07/20 03:16











Weight - Most Recent: 95.481 kg


I&O - Last 24 Hours: 


                                 Intake & Output











 10/06/20 10/06/20 10/07/20





 14:59 22:59 06:59


 


Intake Total 120 320 


 


Balance 120 320 











Med Orders - Current: 


                               Current Medications





Acetaminophen (Tylenol)  650 mg PO Q4H PRN


   PRN Reason: mild pain or fever


Hydrocodone Bitart/Acetaminophen (Norco 325-5 Mg)  1 tab PO Q6H PRN


   PRN Reason: Pain


   Last Admin: 10/06/20 08:30 Dose:  1 tab


   Documented by: 


Benzocaine/Menthol (Dermoplast Pain Relief Spray)  0 gm TOP ASDIRECTED PRN


   PRN Reason: Perineal Comfort Measure


   Last Admin: 10/06/20 00:10 Dose:  1 applic


   Documented by: 


Docusate Sodium (Colace)  100 mg PO BID PRN


   PRN Reason: Constipation


Ibuprofen (Motrin)  600 mg PO Q6H PRN


   PRN Reason: Mild pain or fever


   Last Admin: 10/06/20 07:05 Dose:  600 mg


   Documented by: 


Witch Hazel (Tucks)  1 pad TOP ASDIRECTED PRN


   PRN Reason: Perineal Comfort Measure


   Last Admin: 10/06/20 00:09 Dose:  1 applic


   Documented by: 





Discontinued Medications





Lactated Ringer's (Ringers, Lactated)  1,000 mls @ 100 mls/hr IV ASDIRECTED MARIA LUZ


   Last Admin: 10/05/20 13:16 Dose:  100 mls/hr


   Documented by: 


Oxytocin/Lactated Ringer's (Pitocin In Lr 10 Units/1,000 Ml)  10 unit in 1,000 

mls @ 12 mls/hr IV TITRATE MARIA LUZ; Protocol


   Last Titration: 10/05/20 18:30 Dose:  28 munits/min, 168 mls/hr


   Documented by: 


Oxytocin/Lactated Ringer's (Pitocin In Lr 10 Units/1,000 Ml)  10 unit in 1,000 

mls @ 500 mls/hr IV .CONTINUOUS MARIA LUZ


Lactated Ringer's (Ringers, Lactated)  1,000 mls @ 40 mls/hr IV ASDIRECTED MARIA LUZ


Oxytocin/Lactated Ringer's (Pitocin In Lr 20 Units/1,000 Ml)  20 unit in 1,000 

mls @ 45 mls/hr IV TITRATE MARIA LUZ; Protocol


Oxytocin/Lactated Ringer's (Pitocin In Lr 20 Units/1,000 Ml)  20 unit in 1,000 

mls @ 90 mls/hr IV TITRATE MARIA LUZ; Protocol


   Last Admin: 10/05/20 19:13 Dose:  90 mls/hr


   Documented by: 


Nalbuphine HCl (Nubain)  10 mg IVPUSH Q2H PRN


   PRN Reason: Pain


Ondansetron HCl (Zofran)  4 mg IVPUSH Q4H PRN


   PRN Reason: Nausea/Vomiting


Oxycodone/Acetaminophen (Percocet 325-5 Mg)  1 tab PO Q6H PRN


   PRN Reason: Pain


Sodium Chloride (Saline Flush)  10 ml FLUSH ASDIRECTED PRN


   PRN Reason: Keep Vein Open











- Infant Interaction


Infant Disposition, Postpartum:  in Room with Family


Infant Interaction: Holding Infant


Infant Feeding:  Infant; Nursed Well


Support Person: Significant Other





- Postpartum Recovery Exam


Fundal Tone: Firm


Fundal Level: At Umbilicus


Fundal Placement: Midline


Lochia Amount: Small


Lochia Color: Rubra/Red


Perineum Description: Intact, Minimal Bruising/Swelling


Episiotomy/Laceration: None


Bladder Status: Voiding


Urinary Elimination: Voided





- Exam


General: Alert, Oriented, Cooperative


GI/Abdominal Exam: Soft, Non-Tender


Extremities: Normal Inspection


Skin: Warm, Dry, Intact





- Problem List & Annotations


(1) 36 weeks gestation of pregnancy


SNOMED Code(s): 54744097


   Code(s): Z3A.36 - 36 WEEKS GESTATION OF PREGNANCY   Status: Acute   Current 

Visit: No   





(2) Cholestasis


SNOMED Code(s): 10654162


   Code(s): K83.1 - OBSTRUCTION OF BILE DUCT   Status: Acute   Current Visit: No

  





(3) History of 


SNOMED Code(s): 830767387


   Code(s): Z98.891 - HISTORY OF UTERINE SCAR FROM PREVIOUS SURGERY   Status: 

Acute   Current Visit: No   





(4) History of 


SNOMED Code(s): 199215833, 751980238


   Code(s): Z98.891 - HISTORY OF UTERINE SCAR FROM PREVIOUS SURGERY   Status: 

Acute   Current Visit: No   





(5) , delivered, current hospitalization


SNOMED Code(s): 811071529


   Code(s): O34.219 - MATERNAL CARE FOR UNSP TYPE SCAR FROM PREVIOUS  

DEL   Status: Acute   Current Visit: No   





- Problem List Review


Problem List Initiated/Reviewed/Updated: Yes





- My Orders


Last 24 Hours: 


My Active Orders





10/06/20 08:02


Acetaminophen/HYDROcodone [Norco 325-5 MG]   1 tab PO Q6H PRN 





10/06/20 23:07


Heat Therapy [OM.PC] PRN 





10/07/20 06:31


Ready for Discharge [RC] PER UNIT ROUTINE 














- Assessment


Assessment:: 





PPD#2





- Plan


Plan:: 





* Routine cares


* Breast feeding


* Discharge home today

## 2020-10-07 NOTE — PCM.DCSUM1
**Discharge Summary





- Discharge Data


Discharge Date: 10/07/20


Discharge Disposition: Home, Self-Care 01


Condition: Good





- Referral to Home Health


Primary Care Physician: 


Mai Villarreal MD








- Discharge Diagnosis/Problem(s)


(1) 36 weeks gestation of pregnancy


SNOMED Code(s): 31190490


   ICD Code: Z3A.36 - 36 WEEKS GESTATION OF PREGNANCY   Status: Acute   Current 

Visit: No   





(2) Cholestasis


SNOMED Code(s): 61285788


   ICD Code: K83.1 - OBSTRUCTION OF BILE DUCT   Status: Acute   Current Visit: 

No   





(3) History of 


SNOMED Code(s): 484452156


   ICD Code: Z98.891 - HISTORY OF UTERINE SCAR FROM PREVIOUS SURGERY   Status: 

Acute   Current Visit: No   





(4) History of 


SNOMED Code(s): 095797476, 098596533


   ICD Code: Z98.891 - HISTORY OF UTERINE SCAR FROM PREVIOUS SURGERY   Status: 

Acute   Current Visit: No   





(5) , delivered, current hospitalization


SNOMED Code(s): 646394342


   ICD Code: O34.219 - MATERNAL CARE FOR UNSP TYPE SCAR FROM PREVIOUS  

DEL   Status: Acute   Current Visit: No   





- Patient Summary/Data


Complications: None


Consults: 


None


Recommended Follow-up Testing/Procedures: 


Follow up in 3 weeks for postpartum check 


Hospital Course: 


34 y/o  woman at 36 2/7 wks who presented for IOL/TOLAC for cholestasis 

of pregnancy.  Induction done with pitocin and AROM.  Progressed to complete 

dilation.  Underwent an uncomplicated .  See delivery note.  Postpartum did 

well and was discharged home on PPD#2  





- Patient Instructions


Diet: Regular Diet as Tolerated


Activity: As Tolerated


Activity, Other: Pelvic rest for 6 weeks 


Driving: May Drive Today


Showering/Bathing: May Shower


Showering/Bathing, Other: May Bathe 


Notify Provider of: Fever, Increased Pain, Swelling and Redness, Drainage, 

Nausea and/or Vomiting





- Discharge Plan


*PRESCRIPTION DRUG MONITORING PROGRAM REVIEWED*: No


*COPY OF PRESCRIPTION DRUG MONITORING REPORT IN PATIENT LALITA: No


Home Medications: 


                                    Home Meds





Prenatal Vits #93/Iron Fum/FA [Prenatal Formula Tablet] 1 each PO DAILY 10/05/20

[History]


Docusate Sodium [Colace] 100 mg PO BID PRN  cap 10/06/20 [Rx]


Ibuprofen [Motrin] 600 mg PO Q6H PRN  tablet 10/06/20 [Rx]








Patient Handouts:  Postpartum Care After Vaginal Delivery


Referrals: 


Mai Villarreal MD [Primary Care Provider] -  (3 weeks for postpartum check, 

can be telehealth appt. Please call [facility] for your appointment. )





- Discharge Summary/Plan Comment


DC Time >30 min.: No





- Patient Data


Vitals - Most Recent: 


                                Last Vital Signs











Temp  36.7 C   10/07/20 03:16


 


Pulse  79   10/07/20 03:16


 


Resp  16   10/07/20 03:16


 


BP  118/59 L  10/07/20 03:16


 


Pulse Ox  98   10/07/20 03:16











Weight - Most Recent: 95.481 kg


I&O - Last 24 hours: 


                                 Intake & Output











 10/06/20 10/06/20 10/07/20





 14:59 22:59 06:59


 


Intake Total 120 320 


 


Balance 120 320 











Med Orders - Current: 


                               Current Medications





Acetaminophen (Tylenol)  650 mg PO Q4H PRN


   PRN Reason: mild pain or fever


Hydrocodone Bitart/Acetaminophen (Norco 325-5 Mg)  1 tab PO Q6H PRN


   PRN Reason: Pain


   Last Admin: 10/06/20 08:30 Dose:  1 tab


   Documented by: 


Benzocaine/Menthol (Dermoplast Pain Relief Spray)  0 gm TOP ASDIRECTED PRN


   PRN Reason: Perineal Comfort Measure


   Last Admin: 10/06/20 00:10 Dose:  1 applic


   Documented by: 


Docusate Sodium (Colace)  100 mg PO BID PRN


   PRN Reason: Constipation


Ibuprofen (Motrin)  600 mg PO Q6H PRN


   PRN Reason: Mild pain or fever


   Last Admin: 10/06/20 07:05 Dose:  600 mg


   Documented by: 


Witch Hazel (Tucks)  1 pad TOP ASDIRECTED PRN


   PRN Reason: Perineal Comfort Measure


   Last Admin: 10/06/20 00:09 Dose:  1 applic


   Documented by: 





Discontinued Medications





Lactated Ringer's (Ringers, Lactated)  1,000 mls @ 100 mls/hr IV ASDIRECTED MARIA LUZ


   Last Admin: 10/05/20 13:16 Dose:  100 mls/hr


   Documented by: 


Oxytocin/Lactated Ringer's (Pitocin In Lr 10 Units/1,000 Ml)  10 unit in 1,000 

mls @ 12 mls/hr IV TITRATE MARIA LUZ; Protocol


   Last Titration: 10/05/20 18:30 Dose:  28 munits/min, 168 mls/hr


   Documented by: 


Oxytocin/Lactated Ringer's (Pitocin In Lr 10 Units/1,000 Ml)  10 unit in 1,000 

mls @ 500 mls/hr IV .CONTINUOUS MARIA LUZ


Lactated Ringer's (Ringers, Lactated)  1,000 mls @ 40 mls/hr IV ASDIRECTED MARIA LUZ


Oxytocin/Lactated Ringer's (Pitocin In Lr 20 Units/1,000 Ml)  20 unit in 1,000 

mls @ 45 mls/hr IV TITRATE MARIA LUZ; Protocol


Oxytocin/Lactated Ringer's (Pitocin In Lr 20 Units/1,000 Ml)  20 unit in 1,000 

mls @ 90 mls/hr IV TITRATE MARIA LUZ; Protocol


   Last Admin: 10/05/20 19:13 Dose:  90 mls/hr


   Documented by: 


Nalbuphine HCl (Nubain)  10 mg IVPUSH Q2H PRN


   PRN Reason: Pain


Ondansetron HCl (Zofran)  4 mg IVPUSH Q4H PRN


   PRN Reason: Nausea/Vomiting


Oxycodone/Acetaminophen (Percocet 325-5 Mg)  1 tab PO Q6H PRN


   PRN Reason: Pain


Sodium Chloride (Saline Flush)  10 ml FLUSH ASDIRECTED PRN


   PRN Reason: Keep Vein Open

## 2023-04-26 ENCOUNTER — HOSPITAL ENCOUNTER (INPATIENT)
Dept: HOSPITAL 41 - JD.OB | Age: 38
LOS: 2 days | Discharge: HOME | End: 2023-04-28
Attending: OBSTETRICS & GYNECOLOGY | Admitting: OBSTETRICS & GYNECOLOGY
Payer: SELF-PAY

## 2023-04-26 DIAGNOSIS — Z90.49: ICD-10-CM

## 2023-04-26 DIAGNOSIS — Z3A.36: ICD-10-CM

## 2023-04-26 DIAGNOSIS — Z88.5: ICD-10-CM

## 2023-04-26 DIAGNOSIS — K83.1: ICD-10-CM

## 2023-04-26 DIAGNOSIS — Z88.0: ICD-10-CM

## 2023-04-27 LAB — EGFRCR SERPLBLD CKD-EPI 2021: 113 ML/MIN (ref 60–?)

## 2023-04-27 PROCEDURE — 3E0R3BZ INTRODUCTION OF ANESTHETIC AGENT INTO SPINAL CANAL, PERCUTANEOUS APPROACH: ICD-10-PCS | Performed by: OBSTETRICS & GYNECOLOGY

## 2023-04-27 PROCEDURE — 3E033VJ INTRODUCTION OF OTHER HORMONE INTO PERIPHERAL VEIN, PERCUTANEOUS APPROACH: ICD-10-PCS | Performed by: OBSTETRICS & GYNECOLOGY

## 2023-04-27 PROCEDURE — 00HU33Z INSERTION OF INFUSION DEVICE INTO SPINAL CANAL, PERCUTANEOUS APPROACH: ICD-10-PCS | Performed by: OBSTETRICS & GYNECOLOGY

## 2023-04-27 PROCEDURE — 10907ZC DRAINAGE OF AMNIOTIC FLUID, THERAPEUTIC FROM PRODUCTS OF CONCEPTION, VIA NATURAL OR ARTIFICIAL OPENING: ICD-10-PCS | Performed by: OBSTETRICS & GYNECOLOGY

## 2023-04-28 VITALS — DIASTOLIC BLOOD PRESSURE: 62 MMHG | HEART RATE: 92 BPM | SYSTOLIC BLOOD PRESSURE: 114 MMHG
